# Patient Record
Sex: FEMALE | Race: WHITE | NOT HISPANIC OR LATINO | Employment: OTHER | URBAN - METROPOLITAN AREA
[De-identification: names, ages, dates, MRNs, and addresses within clinical notes are randomized per-mention and may not be internally consistent; named-entity substitution may affect disease eponyms.]

---

## 2022-03-15 ENCOUNTER — MEDICAL CORRESPONDENCE (OUTPATIENT)
Dept: HEALTH INFORMATION MANAGEMENT | Facility: CLINIC | Age: 77
End: 2022-03-15
Payer: COMMERCIAL

## 2022-03-17 ENCOUNTER — TRANSCRIBE ORDERS (OUTPATIENT)
Dept: OTHER | Age: 77
End: 2022-03-17

## 2022-03-17 DIAGNOSIS — R74.01 TRANSAMINITIS: Primary | ICD-10-CM

## 2022-03-17 DIAGNOSIS — K83.4 SPHINCTER OF ODDI DYSFUNCTION: ICD-10-CM

## 2022-03-21 ENCOUNTER — PATIENT OUTREACH (OUTPATIENT)
Dept: GASTROENTEROLOGY | Facility: CLINIC | Age: 77
End: 2022-03-21
Payer: COMMERCIAL

## 2022-03-21 NOTE — TELEPHONE ENCOUNTER
Referred by: Dr. Celestino Avila @ [facility]  Ph: [phone] Fx: [phone]    Prior patient of Dr. Del Angel, last seen in 2013, referred back for Hx of sphincter of oddi dysfunction; elevated LFTs, nausea    Per note from referring MD:  5 days ago she started having abdominal discomfort. It had been gradually worsening. She locates it to her epigastric region. She rated it an 8 of 10 and described as sharp and achy.  She has had loss of appetite and some excess gas. Denies diarrhea hematemesis or hematochezia or melena. No vomiting. No weight loss. Eating was aggravating. He had an ultrasound done at her same day clinic visit that was unremarkable. He also had a CBC with a differential showing a normal white count and normal hemoglobin. CMP was completed with normal electrolytes and blood sugar. Renal function normal. Katia has had some mild liver enzyme elevations in the past with normal hepatitis testing. However, her liver enzymes 4 days ago were more markedly elevated with , , and alk phos 207. Her total bili was elevated to 1.6 as well. CRP marginally elevated at 3.2.     Labs 3/15/22  BILIRUBIN,TOTAL 0.2 - 1.3 mg/dL 0.6     ALK PHOSPHATASE  38 - 126 U/L 151 High      AST (SGOT) 14 - 36 U/L 36     ALTv (SGPT) <35 U/L 100 High      eGFR >90 mL/min/1.73m2 >90         Offered clinic with Dr. Del Angel, pt will check with daughter and let us know what date/time works for clinic.    ML

## 2022-04-25 ENCOUNTER — TELEPHONE (OUTPATIENT)
Dept: GASTROENTEROLOGY | Facility: CLINIC | Age: 77
End: 2022-04-25
Payer: COMMERCIAL

## 2022-04-25 NOTE — TELEPHONE ENCOUNTER
[facility] Call Center    Phone Message    May a detailed message be left on voicemail: yes     Reason for Call: Other:     Katia is calling to ask if a phone visit is an option for her appointment on 4/27/2022.  Please call to discuss.    Action Taken: Message routed to:  [facility] ([facility]): carlos ji    Travel Screening: Not Applicable

## 2022-04-25 NOTE — TELEPHONE ENCOUNTER
Returned call to discuss plan for clinic visit- pt unable to do video visit, daughter cannot help her during this date/time. Changed to telephone visit.    ML

## 2022-04-27 ENCOUNTER — VIRTUAL VISIT (OUTPATIENT)
Dept: GASTROENTEROLOGY | Facility: CLINIC | Age: 77
End: 2022-04-27
Attending: FAMILY MEDICINE
Payer: COMMERCIAL

## 2022-04-27 DIAGNOSIS — R74.01 TRANSAMINITIS: ICD-10-CM

## 2022-04-27 DIAGNOSIS — K83.4 SPHINCTER OF ODDI DYSFUNCTION: ICD-10-CM

## 2022-04-27 PROCEDURE — 99204 OFFICE O/P NEW MOD 45 MIN: CPT | Mod: 95 | Performed by: INTERNAL MEDICINE

## 2022-04-27 RX ORDER — TURM/GING/BOS/YUC/WIL/CHAM/HOR 100-100 MG
1 TABLET ORAL
COMMUNITY

## 2022-04-27 RX ORDER — METHENAMINE HIPPURATE 1000 MG/1
1 TABLET ORAL 2 TIMES DAILY
COMMUNITY
Start: 2021-12-02 | End: 2022-05-26

## 2022-04-27 RX ORDER — MULTIVITAMIN
1 TABLET ORAL DAILY
COMMUNITY
Start: 2021-12-13

## 2022-04-27 RX ORDER — MIRABEGRON 50 MG/1
50 TABLET, EXTENDED RELEASE ORAL 2 TIMES DAILY
COMMUNITY
Start: 2021-12-01

## 2022-04-27 RX ORDER — PANTOPRAZOLE SODIUM 40 MG/1
40 TABLET, DELAYED RELEASE ORAL DAILY
Status: ON HOLD | COMMUNITY
Start: 2021-10-24 | End: 2022-06-07

## 2022-04-27 RX ORDER — URSODIOL 300 MG/1
300 CAPSULE ORAL 2 TIMES DAILY
COMMUNITY
Start: 2021-12-13

## 2022-04-27 RX ORDER — ATORVASTATIN CALCIUM 10 MG/1
10 TABLET, FILM COATED ORAL DAILY
COMMUNITY
Start: 2021-12-13

## 2022-04-27 RX ORDER — SUCRALFATE 1 G/1
1 TABLET ORAL 4 TIMES DAILY
COMMUNITY
Start: 2022-02-18

## 2022-04-27 RX ORDER — BUTALB/ACETAMINOPHEN/CAFFEINE 50-325-40
30 TABLET ORAL DAILY
COMMUNITY

## 2022-04-27 RX ORDER — TURMERIC 400 MG
CAPSULE ORAL
COMMUNITY

## 2022-04-27 RX ORDER — BACLOFEN 20 MG/1
20 TABLET ORAL 2 TIMES DAILY
COMMUNITY
Start: 2021-12-13

## 2022-04-27 RX ORDER — TRAZODONE HYDROCHLORIDE 150 MG/1
150 TABLET ORAL AT BEDTIME
COMMUNITY
Start: 2021-12-13

## 2022-04-27 NOTE — LETTER
"    4/27/2022         RE: Prabha Bowling  [address]  [facility] [address]        Dear Colleague,    Thank you for referring your patient, Prabha Bowling, to the [facility] [place]. Please see a copy of my visit note below.    PARVEZ is a 77 year old who is being evaluated via a billable telephone visit.      What phone number would you like to be contacted at? [phone]  How would you like to obtain your AVS? Mail a copy  Phone call duration:  Minutes    Violet Carney    Start time: 1015  End time: 1100    [facility]:     Follow up for  intractable abdominal pain, SOD    Last visit: 5/2013    Date of visit: 4/27/2022    Referring provider: Celestino Avila    CC: 77 year old female referred by Dr Avila for evaluation of intractable abdominal pain and intermittent LFT elevation.    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 She was last seen in clinic here in 5/2013 after having an EUS which showed a patent biliary orifice following sphincterotomy extension though BD tapering with sludge. Sludge was cleaned out with following ERCP and she was started on Ursodiol 300mg BID to decrease sludge burden. She has done very well until these past couple of months during which she has had 3 \"major pain attacks\" that feel very similarly to her prior episodes. Pain is epigastric, radiates to R shoulder, lasts up to 1 hour and occurs randomly. She has no pain outside of these attacks. She also notes constant nausea even with drinking water though has not had any significant weight loss. She does have acid " reflux but has been good about taking her protonix. Continues to have some dyspepsia however. She has alternating constipation and diarrhea related to her MS that has not changed over time. Her fist recent pain attack was in March during which time she had a rise in her LFTs including TB and ALP. She has had two episodes since then. Her LFTs have normalized in-between episodes. Also of note she had a MRCP in 10/2021 for some epigastric pain and was found to have a CBD 11mm (similar to previously) however new evidence of PD dilation to 4.5mm and panc head atrophy. She was also found to have antral ulcers at that time on EGD which was done for some hematochezia which has since resolved. Taken together, it is very pausible she is having biliary colic with recurrent biliary sludge/possible transient stones that may have even led to some episodes of pancreatitis. Certainly it is possible she has papillary restenosis. We will plan to evaluate further with EUS and ERCP. Can re-evaluate her gastric ulcers at that time as well with and EGD. We spent 45 minutes, discussing her situation and treatment options. Her daughter, Martina, was a part of this discussion as well. They are in agreement of further evaluation with procedures as planned.       RECOMMENDATIONS:   -- Plan will be to proceed with outpatient EGD/EUS/ERCP with Dr Del Angel in 2-3 weeks. Need to call daughter, Martina, at her listed cell number to schedule this, midday start time preferable so they can drive from [facility], and return in daylight..   -- Suspect she may benefit from general anesthesia with TEVO, pure propofol, particularly with her age and neurological conditions - will discuss w Anesthesia day of procedure  -- Continue Protonix as you are  -- Continue Ursodiol 300mg BID  -- Okay to continue Aspirin but would avoid NSAIDs       Total time spent 45 minutes, mostly in counseling.      Return to Clinic: TBD pending when above procedures are scheduled.  Patient would like to have port line replaced prior to proceeding with endoscopy and plans to have this done in next couple of days.       Martell Amor MD  Internal Medicine, PGY2  Department of Gastroenterology- Pancreas and Biliary Service   [facility]      Patient discussed and seen with Gastroenterology staff Dr. Bean MD, who is in agreement with the above.     Seen and examined with GI fellow, agree with findings and recommendations.  60 minutes inclding 45 minutes direct telephone, 15 mins review  Agree exactly w above, personaly edited above note  Suspicious for recurrent biliary sludge, w dilated CBD and transient bumping LFts.     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     She was last seen in clinic here in 5/2013 after having an EUS which showed a patent biliary orifice following sphincterotomy extension though BD tapering with sludge. Sludge was cleaned out with following ERCP and she was started on Ursodiol 300mg BID to decrease sludge burden.     She has done very well on Ursodiol and has not need further ERCP/EUS. She did develop some epigastric discomfort in 10/2021 in the setting of hematochezia. She ended up getting a MRCP which showed 11mm CBD (similar to previously) though no stenosis or filling defects. However PD was newly dilated to 4.5mm as well and noted to have pancreatic head atrophy. EUS at that time also found antral ulcers. She was started on PPI which she has continued.      In march of this year she then noted a severe bout of epigastric pain radiating to her R shoulder. She presented to urgent care where her LFTs were elevated including TB and ALP. Abd US showed PD 4.5, and CBD 11 but not evidence of stones or sludge. Since that time she has had two additional episodes of the pain attacks that feel very similarly to her prior episodes. Pain lasts up to 1 hour and occurs randomly. She has no pain outside of these attacks. She also notes constant nausea even with drinking water though has not had any significant weight loss. She has alternating constipation and diarrhea related to her MS that has not changed over time. Her fist recent pain attack was in March during which time she had a rise in her LFTs including TB and ALP. She has had two episodes since then. Her LFTs have normalized in-between episodes.    In terms of her MS, this has been under relatively good control other than some slightly worsened leg weakness. She lives alone still in an apartment in [place] but has a house keeper come every week. Her daughter also lives close by and helps drive her to appointments. She is scheduled to get her port exchanged in the next week or so and is hoping to have this done before having any sort of endoscopic procedure as she apparently has very difficult access.     Targeted GI review of systems complete and is otherwise negative.     Past Medical History:   Diagnosis Date     Allergic rhinitis      Constipation      Gastro-oesophageal reflux disease      Hypertension      Multiple sclerosis (H)      Osteoporosis      Sphincter of Oddi dysfunction      Urinary incontinence        Past Surgical History:   Procedure Laterality Date     bilateral breast biopsies[       bilateral cataract extractions[       CHOLECYSTECTOMY       COLONOSCOPY       ENDOSCOPIC RETROGRADE CHOLANGIOPANCREATOGRAM  8/30/2012    Procedure: ENDOSCOPIC RETROGRADE CHOLANGIOPANCREATOGRAM;  ERCP with biliary  sphincterotomy and biliary stent placement;  Surgeon: Rubén Del Angel MD;  Location: [facility]     ENDOSCOPIC RETROGRADE CHOLANGIOPANCREATOGRAM  6/14/2013    Procedure: ENDOSCOPIC RETROGRADE CHOLANGIOPANCREATOGRAM;  Endoscopic Retrograde Cholangiopancreatogram , with Biliary Sweeping;  Surgeon: Rubén Del Angel MD;  Location:  OR      [facility] ENDOSCOPY W EUS  5/7/2013    Procedure: COMBINED ENDOSCOPIC ULTRASOUND, ESOPHAGOSCOPY, GASTROSCOPY, DUODENOSCOPY (EGD);  Surgeon: Ita Gong MD;  Location:  GI     HYSTERECTOMY      with removal one ovary     TONSILLECTOMY         Family History   Problem Relation Age of Onset     Asthma Brother      C.A.D. Father      C.A.D. Mother      Hypertension Mother      Hypertension Father      Hypertension Sister      Hypertension Sister      Prostate Cancer Brother      Cancer Mother         brain CA       Social History     Tobacco Use     Smoking status: Never Smoker     Smokeless tobacco: Never Used   Substance Use Topics     Alcohol use: No       Physical exam:   Not obtained during phone visit.    Labs:   Component 04/25/22 04/25/22 04/25/22 03/15/22 03/15/22 03/11/22   SODIUM -- -- -- 137 -- 137   POTASSIUM -- -- -- 3.9 -- 3.5   CHLORIDE -- -- -- 104 -- 104   CO2,TOTAL -- -- -- 33 -- 29   ANION GAP -- -- -- 0.6 -- 4.3 Low    GLUCOSE -- -- -- 97 -- 115 High    CALCIUM -- -- 9.7 9.5 -- 9.2   BUN -- -- -- 20 -- 13   CREATININE -- -- -- 0.48 -- 0.44 Low    BUN/CREAT RATIO -- -- -- 42 -- 30   ALBUMIN -- -- -- 4.1 -- 4.3   PROTEIN,TOTAL -- -- -- 6.9 6.2 7.1   GLOBULIN                  -- -- -- 2.8 -- 2.8   A/G RATIO -- -- -- 1.5 -- 1.5   BILIRUBIN,TOTAL -- -- -- 0.6 -- 1.6 High    ALK PHOSPHATASE  -- -- -- 151 -- 207 High    AST (SGOT) -- 37 High  -- 36 -- 347 High    ALTv (SGPT) 24 -- -- 100 -- 387 High    eGFR -- -- -- >90  -- --   GFR if  -- -- -- -- -- >60   GFR if not  -- -- -- -- -- >60         Relevant imaging:   Abdominal  US 3/2022:  1. Status post cholecystectomy.  Dilated common bile duct is unchanged and   likely related to post cholecystectomy state.  No intrahepatic biliary duct   dilatation.   2. Mildly dilated pancreatic duct is similar to the prior MRCP from October 2021.  Visualized portion of the pancreatic parenchyma is unremarkable.   3. Otherwise, unremarkable abdominal ultrasound.    Narrative    EXAM:   US ABDOMEN COMPLETE     INDICATION:   Abdominal pain, unspecified abdominal location     COMPARISON:   Radiographs from earlier the same day.  MRI abdomen MRCP October 14, 2021.     FINDINGS:   The pancreatic duct is mildly dilated at 4.5 mm, which appears similar to the   prior MRCP.  The visualized portion of the pancreatic parenchyma is   unremarkable in appearance.  Gallbladder is surgically absent.  The common duct   is dilated at 1.0 cm near the pancreatic head, similar to the prior MRI.  No   intrahepatic biliary duct dilatation.  The liver is relatively homogeneous in   echotexture and unremarkable in appearance.  No solid mass within the liver.   The abdominal aorta is not aneurysmal.  IVC is patent at the level of the   liver.  Spleen is unremarkable in appearance.  The kidneys are without solid   renal mass, hydronephrosis, perinephric fluid collection, or discrete   nephrolithiasis.  There is a 2.8 cm cyst within the left kidney.  The right   kidney measures approximately 9.6 cm in length and the left kidney measures   approximately 9.9 cm in length.  No free fluid is seen within the visualized   portion of the abdomen.    Procedure Note    Anne Guardado MD - 03/11/2022   Formatting of this note might be different from the original.   EXAM:   US ABDOMEN COMPLETE     INDICATION:   Abdominal pain, unspecified abdominal location     COMPARISON:   Radiographs from earlier the same day.  MRI abdomen MRCP October 14, 2021.     FINDINGS:   The pancreatic duct is mildly dilated at 4.5 mm, which appears  similar to the   prior MRCP.  The visualized portion of the pancreatic parenchyma is   unremarkable in appearance.  Gallbladder is surgically absent.  The common duct   is dilated at 1.0 cm near the pancreatic head, similar to the prior MRI.  No   intrahepatic biliary duct dilatation.  The liver is relatively homogeneous in   echotexture and unremarkable in appearance.  No solid mass within the liver.   The abdominal aorta is not aneurysmal.  IVC is patent at the level of the   liver.  Spleen is unremarkable in appearance.  The kidneys are without solid   renal mass, hydronephrosis, perinephric fluid collection, or discrete   nephrolithiasis.  There is a 2.8 cm cyst within the left kidney.  The right   kidney measures approximately 9.6 cm in length and the left kidney measures   approximately 9.9 cm in length.  No free fluid is seen within the visualized   portion of the abdomen.     IMPRESSION:   1. Status post cholecystectomy.  Dilated common bile duct is unchanged and   likely related to post cholecystectomy state.  No intrahepatic biliary duct   dilatation.   2. Mildly dilated pancreatic duct is similar to the prior MRCP from October 2021.  Visualized portion of the pancreatic parenchyma is unremarkable.   3. Otherwise, unremarkable abdominal ultrasound.      MRCP 10/14/2021:  FINDINGS:   Liver is normal in signal intensity on T2 imaging.  No focal hepatic lesion.   Gallbladder is surgically absent.  Common bile duct measures up to 11 mm. No   evidence of biliary beading, stenosis, or filling defect.  There is pneumobilia   likely related to patient's recent instrumentation.  Pancreatic duct is   non-dilated.         The pancreas is notable for fat atrophy of the pancreatic head.  It is   otherwise unremarkable in appearance.         The spleen, adrenal glands, and kidneys are unremarkable. No hydronephrosis.   There are simple appearing renal cysts present.         No enlarged lymph nodes.  Small amount of  free fluid in the abdomen adjacent to   the hepatic capsule.  Included small and large bowel loops are non-dilated.         No large signal abnormality in the visualized lungs. No suspicious bone marrow   or muscle signal abnormality.         IMPRESSION:   1. Pneumobilia likely related to patient's recent instrumentation.   2. Common bile duct dilation without obstructing mass or stone.   3. Fat atrophy of the pancreatic head with no other pancreatic abnormality.      Endoscopy:  EUS 5/7/2013:  Findings:        Endoscopic exam with the side viewing scope was normal. The major        papilla was normal endoscopically and sonographically.        The bile duct was non-dilated and measured 10 mm in maximal diameter.        The gallbladder was absent. Air and sludge were seen in the bile duct.        Sludge was seen passing through the biliary orifice. There were no        stones or sludge.        The pancreatic parenchyma appeared normal. There were no features of        chronic pancreatitis. No masses, cysts or stones were visualized in the        pancreatic parenchyma. The main pancreatic duct was followed from the        head to the body, excluding pancreas divisum. The pancreatic duct        measured 2.6 mm in the head, 1.1 in the body and 0.9 in the tail of the        pancreas.        No lymph nodes were seen in the upper abdomen and mediastinum.        No masses were seen in the visualized portions of the liver.        The left adrenal appeared normal.                                                                                    Impression:               67 yo female with MS, s/p ERCP with sphincterotomy,                             recurrent abdominal pain                             - Patent biliary orifice with air in the bile duct                             however the duct tapers distally and there was                             echogenic sludge in the duct. Some sludge was seen                              passing through the biliary orifce                             - Normal pancreas   Recommendation:           - Discharge patient to home.                             - Follow-up with Dr. Del Angel                             - Consider ursodiol for recurrent biliary sludge

## 2022-04-27 NOTE — NURSING NOTE
Patient states she is also taking a Luten supplement daily and has Ocrivus infusions two times per year.       Violet Carney on 4/27/2022 at 10:01 AM

## 2022-04-27 NOTE — PROGRESS NOTES
"PARVEZ is a 77 year old who is being evaluated via a billable telephone visit.      What phone number would you like to be contacted at? [phone]  How would you like to obtain your AVS? Mail a copy  Phone call duration:  Minutes    Violet Carney    Start time: 1015  End time: 1100    [facility]:     Follow up for  intractable abdominal pain, SOD    Last visit: 5/2013    Date of visit: 4/27/2022    Referring provider: Celestino Avila    CC: 77 year old female referred by Dr Avila for evaluation of intractable abdominal pain and intermittent LFT elevation.    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 She was last seen in clinic here in 5/2013 after having an EUS which showed a patent biliary orifice following sphincterotomy extension though BD tapering with sludge. Sludge was cleaned out with following ERCP and she was started on Ursodiol 300mg BID to decrease sludge burden. She has done very well until these past couple of months during which she has had 3 \"major pain attacks\" that feel very similarly to her prior episodes. Pain is epigastric, radiates to R shoulder, lasts up to 1 hour and occurs randomly. She has no pain outside of these attacks. She also notes constant nausea even with drinking water though has not had any significant weight loss. She does have acid reflux but has been good about taking her protonix. Continues to have some dyspepsia however. She has alternating constipation and diarrhea related to her MS that has not changed over time. Her fist recent pain attack was in March during which time she had a rise in her LFTs " including TB and ALP. She has had two episodes since then. Her LFTs have normalized in-between episodes. Also of note she had a MRCP in 10/2021 for some epigastric pain and was found to have a CBD 11mm (similar to previously) however new evidence of PD dilation to 4.5mm and panc head atrophy. She was also found to have antral ulcers at that time on EGD which was done for some hematochezia which has since resolved. Taken together, it is very pausible she is having biliary colic with recurrent biliary sludge/possible transient stones that may have even led to some episodes of pancreatitis. Certainly it is possible she has papillary restenosis. We will plan to evaluate further with EUS and ERCP. Can re-evaluate her gastric ulcers at that time as well with and EGD. We spent 45 minutes, discussing her situation and treatment options. Her daughter, Martina, was a part of this discussion as well. They are in agreement of further evaluation with procedures as planned.       RECOMMENDATIONS:   -- Plan will be to proceed with outpatient EGD/EUS/ERCP with Dr Del Angel in 2-3 weeks. Need to call daughter, Martina, at her listed cell number to schedule this, midday start time preferable so they can drive from [facility], and return in daylight..   -- Suspect she may benefit from general anesthesia with TEVO, pure propofol, particularly with her age and neurological conditions - will discuss w Anesthesia day of procedure  -- Continue Protonix as you are  -- Continue Ursodiol 300mg BID  -- Okay to continue Aspirin but would avoid NSAIDs       Total time spent 45 minutes, mostly in counseling.      Return to Clinic: TBD pending when above procedures are scheduled. Patient would like to have port line replaced prior to proceeding with endoscopy and plans to have this done in next couple of days.       Martell Amor MD  Internal Medicine, PGY2  Department of Gastroenterology- Pancreas and Biliary Service   [facility]  [place]      Patient discussed and seen with Gastroenterology staff Dr. Bean MD, who is in agreement with the above.     Seen and examined with GI fellow, agree with findings and recommendations.  60 minutes inclding 45 minutes direct telephone, 15 mins review  Agree exactly w above, personaly edited above note  Suspicious for recurrent biliary sludge, w dilated CBD and transient bumping LFts.     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     She was last seen in clinic here in 5/2013 after having an EUS which showed a patent biliary orifice following sphincterotomy extension though BD tapering with sludge. Sludge was cleaned out with following ERCP and she was started on Ursodiol 300mg BID to decrease sludge burden.     She has done very well on Ursodiol and has not need further ERCP/EUS. She did develop some epigastric discomfort in 10/2021 in the setting of hematochezia. She ended up getting a MRCP which showed 11mm CBD (similar to previously) though no stenosis or filling defects. However PD was newly dilated to 4.5mm as well and noted to have pancreatic head atrophy. EUS at that time also found antral ulcers. She was started on PPI which she has continued.     In march of this year she then noted a severe bout of epigastric pain radiating to her R shoulder. She presented to urgent care where her LFTs were elevated including TB and ALP. Abd US showed PD 4.5, and CBD 11 but not evidence of stones or sludge. Since  that time she has had two additional episodes of the pain attacks that feel very similarly to her prior episodes. Pain lasts up to 1 hour and occurs randomly. She has no pain outside of these attacks. She also notes constant nausea even with drinking water though has not had any significant weight loss. She has alternating constipation and diarrhea related to her MS that has not changed over time. Her fist recent pain attack was in March during which time she had a rise in her LFTs including TB and ALP. She has had two episodes since then. Her LFTs have normalized in-between episodes.    In terms of her MS, this has been under relatively good control other than some slightly worsened leg weakness. She lives alone still in an apartment in [place] but has a house keeper come every week. Her daughter also lives close by and helps drive her to appointments. She is scheduled to get her port exchanged in the next week or so and is hoping to have this done before having any sort of endoscopic procedure as she apparently has very difficult access.     Targeted GI review of systems complete and is otherwise negative.     Past Medical History:   Diagnosis Date     Allergic rhinitis      Constipation      Gastro-oesophageal reflux disease      Hypertension      Multiple sclerosis (H)      Osteoporosis      Sphincter of Oddi dysfunction      Urinary incontinence        Past Surgical History:   Procedure Laterality Date     bilateral breast biopsies[       bilateral cataract extractions[       CHOLECYSTECTOMY       COLONOSCOPY       ENDOSCOPIC RETROGRADE CHOLANGIOPANCREATOGRAM  8/30/2012    Procedure: ENDOSCOPIC RETROGRADE CHOLANGIOPANCREATOGRAM;  ERCP with biliary sphincterotomy and biliary stent placement;  Surgeon: Rubén Del Angel MD;  Location: [facility]     ENDOSCOPIC RETROGRADE CHOLANGIOPANCREATOGRAM  6/14/2013    Procedure: ENDOSCOPIC RETROGRADE CHOLANGIOPANCREATOGRAM;  Endoscopic Retrograde Cholangiopancreatogram , with  Biliary Sweeping;  Surgeon: Rubén Del Angel MD;  Location:  OR      [facility] ENDOSCOPY W EUS  5/7/2013    Procedure: COMBINED ENDOSCOPIC ULTRASOUND, ESOPHAGOSCOPY, GASTROSCOPY, DUODENOSCOPY (EGD);  Surgeon: Ita Gong MD;  Location:  GI     HYSTERECTOMY      with removal one ovary     TONSILLECTOMY         Family History   Problem Relation Age of Onset     Asthma Brother      C.A.D. Father      C.A.D. Mother      Hypertension Mother      Hypertension Father      Hypertension Sister      Hypertension Sister      Prostate Cancer Brother      Cancer Mother         brain CA       Social History     Tobacco Use     Smoking status: Never Smoker     Smokeless tobacco: Never Used   Substance Use Topics     Alcohol use: No       Physical exam:   Not obtained during phone visit.    Labs:   Component 04/25/22 04/25/22 04/25/22 03/15/22 03/15/22 03/11/22   SODIUM -- -- -- 137 -- 137   POTASSIUM -- -- -- 3.9 -- 3.5   CHLORIDE -- -- -- 104 -- 104   CO2,TOTAL -- -- -- 33 -- 29   ANION GAP -- -- -- 0.6 -- 4.3 Low    GLUCOSE -- -- -- 97 -- 115 High    CALCIUM -- -- 9.7 9.5 -- 9.2   BUN -- -- -- 20 -- 13   CREATININE -- -- -- 0.48 -- 0.44 Low    BUN/CREAT RATIO -- -- -- 42 -- 30   ALBUMIN -- -- -- 4.1 -- 4.3   PROTEIN,TOTAL -- -- -- 6.9 6.2 7.1   GLOBULIN                  -- -- -- 2.8 -- 2.8   A/G RATIO -- -- -- 1.5 -- 1.5   BILIRUBIN,TOTAL -- -- -- 0.6 -- 1.6 High    ALK PHOSPHATASE  -- -- -- 151 -- 207 High    AST (SGOT) -- 37 High  -- 36 -- 347 High    ALTv (SGPT) 24 -- -- 100 -- 387 High    eGFR -- -- -- >90  -- --   GFR if  -- -- -- -- -- >60   GFR if not  -- -- -- -- -- >60         Relevant imaging:   Abdominal US 3/2022:  1. Status post cholecystectomy.  Dilated common bile duct is unchanged and   likely related to post cholecystectomy state.  No intrahepatic biliary duct   dilatation.   2. Mildly dilated pancreatic duct is similar to the prior MRCP from October 2021.   Visualized portion of the pancreatic parenchyma is unremarkable.   3. Otherwise, unremarkable abdominal ultrasound.    Narrative    EXAM:   US ABDOMEN COMPLETE     INDICATION:   Abdominal pain, unspecified abdominal location     COMPARISON:   Radiographs from earlier the same day.  MRI abdomen MRCP October 14, 2021.     FINDINGS:   The pancreatic duct is mildly dilated at 4.5 mm, which appears similar to the   prior MRCP.  The visualized portion of the pancreatic parenchyma is   unremarkable in appearance.  Gallbladder is surgically absent.  The common duct   is dilated at 1.0 cm near the pancreatic head, similar to the prior MRI.  No   intrahepatic biliary duct dilatation.  The liver is relatively homogeneous in   echotexture and unremarkable in appearance.  No solid mass within the liver.   The abdominal aorta is not aneurysmal.  IVC is patent at the level of the   liver.  Spleen is unremarkable in appearance.  The kidneys are without solid   renal mass, hydronephrosis, perinephric fluid collection, or discrete   nephrolithiasis.  There is a 2.8 cm cyst within the left kidney.  The right   kidney measures approximately 9.6 cm in length and the left kidney measures   approximately 9.9 cm in length.  No free fluid is seen within the visualized   portion of the abdomen.    Procedure Note    Anne Guardado MD - 03/11/2022   Formatting of this note might be different from the original.   EXAM:   US ABDOMEN COMPLETE     INDICATION:   Abdominal pain, unspecified abdominal location     COMPARISON:   Radiographs from earlier the same day.  MRI abdomen MRCP October 14, 2021.     FINDINGS:   The pancreatic duct is mildly dilated at 4.5 mm, which appears similar to the   prior MRCP.  The visualized portion of the pancreatic parenchyma is   unremarkable in appearance.  Gallbladder is surgically absent.  The common duct   is dilated at 1.0 cm near the pancreatic head, similar to the prior MRI.  No   intrahepatic biliary  duct dilatation.  The liver is relatively homogeneous in   echotexture and unremarkable in appearance.  No solid mass within the liver.   The abdominal aorta is not aneurysmal.  IVC is patent at the level of the   liver.  Spleen is unremarkable in appearance.  The kidneys are without solid   renal mass, hydronephrosis, perinephric fluid collection, or discrete   nephrolithiasis.  There is a 2.8 cm cyst within the left kidney.  The right   kidney measures approximately 9.6 cm in length and the left kidney measures   approximately 9.9 cm in length.  No free fluid is seen within the visualized   portion of the abdomen.     IMPRESSION:   1. Status post cholecystectomy.  Dilated common bile duct is unchanged and   likely related to post cholecystectomy state.  No intrahepatic biliary duct   dilatation.   2. Mildly dilated pancreatic duct is similar to the prior MRCP from October 2021.  Visualized portion of the pancreatic parenchyma is unremarkable.   3. Otherwise, unremarkable abdominal ultrasound.      MRCP 10/14/2021:  FINDINGS:   Liver is normal in signal intensity on T2 imaging.  No focal hepatic lesion.   Gallbladder is surgically absent.  Common bile duct measures up to 11 mm. No   evidence of biliary beading, stenosis, or filling defect.  There is pneumobilia   likely related to patient's recent instrumentation.  Pancreatic duct is   non-dilated.         The pancreas is notable for fat atrophy of the pancreatic head.  It is   otherwise unremarkable in appearance.         The spleen, adrenal glands, and kidneys are unremarkable. No hydronephrosis.   There are simple appearing renal cysts present.         No enlarged lymph nodes.  Small amount of free fluid in the abdomen adjacent to   the hepatic capsule.  Included small and large bowel loops are non-dilated.         No large signal abnormality in the visualized lungs. No suspicious bone marrow   or muscle signal abnormality.         IMPRESSION:   1. Pneumobilia  likely related to patient's recent instrumentation.   2. Common bile duct dilation without obstructing mass or stone.   3. Fat atrophy of the pancreatic head with no other pancreatic abnormality.      Endoscopy:  EUS 5/7/2013:  Findings:        Endoscopic exam with the side viewing scope was normal. The major        papilla was normal endoscopically and sonographically.        The bile duct was non-dilated and measured 10 mm in maximal diameter.        The gallbladder was absent. Air and sludge were seen in the bile duct.        Sludge was seen passing through the biliary orifice. There were no        stones or sludge.        The pancreatic parenchyma appeared normal. There were no features of        chronic pancreatitis. No masses, cysts or stones were visualized in the        pancreatic parenchyma. The main pancreatic duct was followed from the        head to the body, excluding pancreas divisum. The pancreatic duct        measured 2.6 mm in the head, 1.1 in the body and 0.9 in the tail of the        pancreas.        No lymph nodes were seen in the upper abdomen and mediastinum.        No masses were seen in the visualized portions of the liver.        The left adrenal appeared normal.                                                                                    Impression:               69 yo female with MS, s/p ERCP with sphincterotomy,                             recurrent abdominal pain                             - Patent biliary orifice with air in the bile duct                             however the duct tapers distally and there was                             echogenic sludge in the duct. Some sludge was seen                             passing through the biliary orifce                             - Normal pancreas   Recommendation:           - Discharge patient to home.                             - Follow-up with Dr. Del Angel                             - Consider ursodiol for recurrent biliary  sludge

## 2022-04-27 NOTE — PROGRESS NOTES
"PARVEZ is a 77 year old who is being evaluated via a billable telephone visit.      What phone number would you like to be contacted at? ***  How would you like to obtain your AVS? {AVS Preference:776608}      Subjective   PARVEZ is a 77 year old who presents for the following health issues {ACCOMPANIED BY STATEMENT (Optional):691084}    HPI     ***    Review of Systems   {ROS COMP (Optional):158633}      Objective    Vitals - Patient Reported  Systolic (Patient Reported): 128  Diastolic (Patient Reported): 74  Weight (Patient Reported): 64.9 kg (143 lb)  Pulse (Patient Reported): 73        Physical Exam   {GENERAL APPEARANCE:50::\"healthy\",\"alert\",\"no distress\"}  PSYCH: Alert and oriented times 3;    "

## 2022-04-28 ENCOUNTER — PATIENT OUTREACH (OUTPATIENT)
Dept: GASTROENTEROLOGY | Facility: CLINIC | Age: 77
End: 2022-04-28
Payer: COMMERCIAL

## 2022-04-28 NOTE — PATIENT INSTRUCTIONS
Follow up:    Dr. Del Angel has outlined the following steps after your recent clinic visit:     RECOMMENDATIONS:   -- Plan will be to proceed with outpatient EGD/EUS/ERCP with Dr Del Angel in 2-3 weeks. Need to call daughter, Martina, at her listed cell number to schedule this, midday start time preferable so they can drive from [employer], and return in daylight..   -- Continue Protonix as you are  -- Continue Ursodiol 300mg BID  -- Okay to continue Aspirin but would avoid NSAIDs       Please call with any questions or concerns regarding your clinic visit today.    It is a pleasure being involved in your health care.    Contacts post-consultation depending on your need:    Schedule Clinic Appointments            [phone] # 1   M-F 7:30 - 5 pm    Tata Acevedo RN Care Coordinator  [phone]     OR Procedure Scheduling                             [phone]    For urgent/emergent questions after business hours, you may reach the on-call GI Fellow by contacting the [facility]  at [phone].    How do I schedule labs, imaging studies, or procedures that were ordered in clinic today?     Labs: To schedule lab appointment at the [facility], use my chart or call [phone]. If you have a [facility] lab closer to home where you are regularly seen you can give them a call.     Procedures: If a colonoscopy, upper endoscopy, breath test, esophageal manometry, or pH impedence was ordered today, our endoscopy team will call you to schedule this. If you have not heard from our endoscopy team within a week, please call [phone] to schedule.     Imaging Studies: If you were scheduled for a CT scan, X-ray, MRI, ultrasound, HIDA scan or other imaging study, please call [phone] to have this scheduled.     Referral: If a referral to another specialty was ordered, expect a phone call or follow instructions above. If you have not heard from anyone regarding your referral in a week,  please call our clinic to check the status.     How to I schedule a follow-up visit?  If you did not schedule a follow-up visit today, please call [phone] to schedule a follow-up office visit.

## 2022-04-28 NOTE — TELEPHONE ENCOUNTER
Called patient and daughter Martina regarding follow up after clinic:    Martina will call back for times after her mom's visit for a port issue    RECOMMENDATIONS:   -- Plan will be to proceed with outpatient EGD/EUS/ERCP with Dr Del Angel in 2-3 weeks. Need to call daughter, Martina, at her listed cell number to schedule this, midday start time preferable so they can drive from [email or web address] [place], and return in daylight..   -- Continue Protonix as you are  -- Continue Ursodiol 300mg BID  -- Okay to continue Aspirin but would avoid NSAIDs    Please assist in scheduling:     Procedure/Imaging/Clinic: EGD/EUS/ERCP  Physician: Selma  Timin/31  Procedure length:provider average  Anesthesia:gen  Dx: SOD  Tier:2  Location: [facility]       Called to discuss with patient. Explained they can expect a call from  for date and time of procedure, will need a , someone to stay with them for 24 hours and should stay in town for 24 hours (within 45 min of Hospital) post procedure    Patient needs to get pre-op physical completed. If outside [facility] system will need physical faxed to number [phone]   If you do not get a preop physical, your procedure could be cancelled, patient voiced understanding*    Preop Plan:PCP    Med Review    Blood thinner -  no  ASA - no  Diabetic - no    COVID test discussed: will do at PCP    Patient Education r/t procedure:done    Does patient have any history of gastric bypass/gastric surgery/altered panc/bili anatomy?no    A pre-op nurse will call 1-2 days prior to the procedure. I    NPO/Prep: not discussed    Other specific details/comments:none     Verbalized understanding of all instructions. All questions answered.

## 2022-05-03 ENCOUNTER — PREP FOR PROCEDURE (OUTPATIENT)
Dept: GASTROENTEROLOGY | Facility: CLINIC | Age: 77
End: 2022-05-03
Payer: COMMERCIAL

## 2022-05-03 ENCOUNTER — TRANSFERRED RECORDS (OUTPATIENT)
Dept: HEALTH INFORMATION MANAGEMENT | Facility: CLINIC | Age: 77
End: 2022-05-03
Payer: COMMERCIAL

## 2022-05-03 DIAGNOSIS — K83.4 SPHINCTER OF ODDI DYSFUNCTION: Primary | ICD-10-CM

## 2022-05-03 NOTE — TELEPHONE ENCOUNTER
pT will have port exchanged on 5/12, daughter asking for dates last week of May, wants later time of day, 10am after.    Orders placed for 5/31, noting requested later start time, reminded pt will need preop and covid test.  ML

## 2022-05-16 ENCOUNTER — TELEPHONE (OUTPATIENT)
Dept: GASTROENTEROLOGY | Facility: CLINIC | Age: 77
End: 2022-05-16
Payer: COMMERCIAL

## 2022-05-16 NOTE — TELEPHONE ENCOUNTER
[facility] Call Center    Phone Message    May a detailed message be left on voicemail: yes     Reason for Call: Other:     Katia is calling to ask if the pre-op she had on 5/9/2022 will suffice for the procedure scheduled for 5/31/2022.  Please call to clarify.    Action Taken: Message routed to:  [facility] ([facility]): carlos ji    Travel Screening: Not Applicable

## 2022-05-19 DIAGNOSIS — Z11.59 ENCOUNTER FOR SCREENING FOR OTHER VIRAL DISEASES: Primary | ICD-10-CM

## 2022-05-19 DIAGNOSIS — Z01.810 PRE-OPERATIVE CARDIOVASCULAR EXAMINATION: Primary | ICD-10-CM

## 2022-05-20 ENCOUNTER — DOCUMENTATION ONLY (OUTPATIENT)
Dept: GASTROENTEROLOGY | Facility: CLINIC | Age: 77
End: 2022-05-20
Payer: COMMERCIAL

## 2022-05-20 NOTE — PROGRESS NOTES
VO - Fax COVID and EKG orders to [facility].    Faxed to [phone]    Faxed on 5/20/2022 at 1:16PM

## 2022-05-23 ENCOUNTER — TRANSFERRED RECORDS (OUTPATIENT)
Dept: HEALTH INFORMATION MANAGEMENT | Facility: CLINIC | Age: 77
End: 2022-05-23
Payer: COMMERCIAL

## 2022-05-24 ENCOUNTER — DOCUMENTATION ONLY (OUTPATIENT)
Dept: GASTROENTEROLOGY | Facility: CLINIC | Age: 77
End: 2022-05-24
Payer: COMMERCIAL

## 2022-05-24 NOTE — PROGRESS NOTES
VO - Fax EKG and COVID to [facility]    Faxed orders on 5/23/2022 at 4:05PM  [phone]    Called [phone] on 5/24/2022 at 10:55AM and they did receive the fax. EKG patient completed  Yesterday. Pending COVID testing on Friday.

## 2022-05-26 ENCOUNTER — PREP FOR PROCEDURE (OUTPATIENT)
Dept: GASTROENTEROLOGY | Facility: CLINIC | Age: 77
End: 2022-05-26
Payer: COMMERCIAL

## 2022-05-26 ENCOUNTER — PATIENT OUTREACH (OUTPATIENT)
Dept: GASTROENTEROLOGY | Facility: CLINIC | Age: 77
End: 2022-05-26
Payer: COMMERCIAL

## 2022-05-29 DIAGNOSIS — Z11.59 ENCOUNTER FOR SCREENING FOR OTHER VIRAL DISEASES: Primary | ICD-10-CM

## 2022-06-01 NOTE — BRIEF OP NOTE
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]   Post-operative diagnosis As prior   Procedure: Procedure(s):  ENDOSCOPIC ULTRASOUND, ESOPHAGOSCOPY / UPPER GASTROINTESTINAL TRACT (GI)  ENDOSCOPIC RETROGRADE CHOLANGIOPANCREATOGRAPHY   Surgeon(s): Surgeon(s) and Role:  Panel 1:     * Geoffrey Quintanilla MD - Primary  Panel 2:     * Rubén Del Angel MD - Primary   Estimated blood loss: 1 mL    Specimens: * No specimens in log *   Findings:    76 yo female with history of MS, hiatal hernia, gastric ulcers/gastritis, gallstones/choledocholithiasis s/p lap becca many years ago and 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 She was last seen in clinic here in 5/2013 after having an EUS which showed a patent biliary orifice following sphincterotomy extension though BD tapering with sludge. Sludge was cleaned out with following ERCP and she was started on Ursodiol 300mg BID to decrease sludge burden. She has done very well until these past couple of months during which she has had 3 \"major pain attacks\" that feel very similarly to her prior episodes. Her fist recent pain attack was in March during which time she had a rise in her LFTs. MRCP in 10/2021 for some epigastric pain and was found to have a CBD 11mm (similar to previously) however new evidence of PD dilation to 4.5mm and panc head atrophy. She was also found to have antral ulcers at that time on EGD which was done for some hematochezia which has since resolved. Taken together, it is very pausible she is having biliary colic with recurrent biliary sludge/possible transient stones. Certainly it is possible she has papillary restenosis. We will plan to evaluate further with EGD/EUS and ERCP.       - Prior biliary sphincterotomy appeared open.   - The " entire main bile duct was dilated to ~11 mm.   - The biliary tree was swept and sludge was found.   - One prophylactic 4 Fr Pa plastic stent was placed into the ventral pancreatic duct.   - Biliary sphincterotomy was successfully dilated to 10 mm with adequate drainage of contrast.       - Observe patient in PACU for possible discharge same day.  - Increase pantoprazole to 40 mg BID given gastric antral erosions and small ulcerations seen on upper endoscopy earlier today (sent to patient's pharmacy).  - Obtain a KUB in 4 weeks to ensure spontanous passage of pancreatic stent. If still in place will need an EGD in unit J for stent removal.   - Return to GI clinic with Dr. Del Angel in 2 months.   - The findings and recommendations were discussed with the patient and their family.

## 2022-06-06 ENCOUNTER — ANESTHESIA EVENT (OUTPATIENT)
Dept: SURGERY | Facility: CLINIC | Age: 77
End: 2022-06-06
Payer: MEDICARE

## 2022-06-07 ENCOUNTER — ANESTHESIA (OUTPATIENT)
Dept: SURGERY | Facility: CLINIC | Age: 77
End: 2022-06-07
Payer: MEDICARE

## 2022-06-07 ENCOUNTER — HOSPITAL ENCOUNTER (OUTPATIENT)
Facility: CLINIC | Age: 77
Discharge: HOME | End: 2022-06-07
Attending: INTERNAL MEDICINE | Admitting: INTERNAL MEDICINE
Payer: MEDICARE

## 2022-06-07 VITALS
DIASTOLIC BLOOD PRESSURE: 91 MMHG | TEMPERATURE: 97.6 F | WEIGHT: 161.38 LBS | BODY MASS INDEX: 29.7 KG/M2 | RESPIRATION RATE: 20 BRPM | OXYGEN SATURATION: 100 % | SYSTOLIC BLOOD PRESSURE: 181 MMHG | HEART RATE: 75 BPM | HEIGHT: 62 IN

## 2022-06-07 DIAGNOSIS — K25.9 GASTRIC EROSION, UNSPECIFIED CHRONICITY: Primary | ICD-10-CM

## 2022-06-07 LAB
ALBUMIN SERPL-MCNC: 3.5 G/DL (ref 3.4–5)
ALP SERPL-CCNC: 88 U/L (ref 40–150)
ALT SERPL W P-5'-P-CCNC: 21 U/L (ref 0–50)
AMYLASE SERPL-CCNC: 41 U/L (ref 30–110)
ANION GAP SERPL CALCULATED.3IONS-SCNC: 6 MMOL/L (ref 3–14)
AST SERPL W P-5'-P-CCNC: 16 U/L (ref 0–45)
BILIRUB SERPL-MCNC: 0.6 MG/DL (ref 0.2–1.3)
BUN SERPL-MCNC: 21 MG/DL (ref 7–30)
CALCIUM SERPL-MCNC: 9.2 MG/DL (ref 8.5–10.1)
CHLORIDE BLD-SCNC: 110 MMOL/L (ref 94–109)
CO2 SERPL-SCNC: 27 MMOL/L (ref 20–32)
CREAT SERPL-MCNC: 0.54 MG/DL (ref 0.52–1.04)
ERYTHROCYTE [DISTWIDTH] IN BLOOD BY AUTOMATED COUNT: 13.7 % (ref 10–15)
GFR SERPL CREATININE-BSD FRML MDRD: >90 ML/MIN/1.73M2
GLUCOSE BLD-MCNC: 87 MG/DL (ref 70–99)
GLUCOSE BLDC GLUCOMTR-MCNC: 101 MG/DL (ref 70–99)
HCT VFR BLD AUTO: 41 % (ref 35–47)
HGB BLD-MCNC: 13.3 G/DL (ref 11.7–15.7)
INR PPP: 0.95 (ref 0.85–1.15)
LIPASE SERPL-CCNC: 91 U/L (ref 73–393)
MCH RBC QN AUTO: 30 PG (ref 26.5–33)
MCHC RBC AUTO-ENTMCNC: 32.4 G/DL (ref 31.5–36.5)
MCV RBC AUTO: 92 FL (ref 78–100)
PLATELET # BLD AUTO: 232 10E3/UL (ref 150–450)
POTASSIUM BLD-SCNC: 3.7 MMOL/L (ref 3.4–5.3)
PROT SERPL-MCNC: 6.6 G/DL (ref 6.8–8.8)
RBC # BLD AUTO: 4.44 10E6/UL (ref 3.8–5.2)
SODIUM SERPL-SCNC: 143 MMOL/L (ref 133–144)
UPPER EUS: NORMAL
WBC # BLD AUTO: 4.9 10E3/UL (ref 4–11)

## 2022-06-07 PROCEDURE — 82962 GLUCOSE BLOOD TEST: CPT

## 2022-06-07 PROCEDURE — 360N000083 HC SURGERY LEVEL 3 W/ FLUORO, PER MIN: Performed by: INTERNAL MEDICINE

## 2022-06-07 PROCEDURE — C1877 STENT, NON-COAT/COV W/O DEL: HCPCS | Performed by: INTERNAL MEDICINE

## 2022-06-07 PROCEDURE — 250N000011 HC RX IP 250 OP 636: Performed by: REGISTERED NURSE

## 2022-06-07 PROCEDURE — C1726 CATH, BAL DIL, NON-VASCULAR: HCPCS | Performed by: INTERNAL MEDICINE

## 2022-06-07 PROCEDURE — 370N000017 HC ANESTHESIA TECHNICAL FEE, PER MIN: Performed by: INTERNAL MEDICINE

## 2022-06-07 PROCEDURE — 85027 COMPLETE CBC AUTOMATED: CPT | Performed by: INTERNAL MEDICINE

## 2022-06-07 PROCEDURE — 250N000009 HC RX 250: Performed by: REGISTERED NURSE

## 2022-06-07 PROCEDURE — 85610 PROTHROMBIN TIME: CPT | Performed by: INTERNAL MEDICINE

## 2022-06-07 PROCEDURE — 999N000141 HC STATISTIC PRE-PROCEDURE NURSING ASSESSMENT: Performed by: INTERNAL MEDICINE

## 2022-06-07 PROCEDURE — 272N000001 HC OR GENERAL SUPPLY STERILE: Performed by: INTERNAL MEDICINE

## 2022-06-07 PROCEDURE — 255N000002 HC RX 255 OP 636: Performed by: INTERNAL MEDICINE

## 2022-06-07 PROCEDURE — 250N000011 HC RX IP 250 OP 636: Performed by: NURSE ANESTHETIST, CERTIFIED REGISTERED

## 2022-06-07 PROCEDURE — 82150 ASSAY OF AMYLASE: CPT | Performed by: INTERNAL MEDICINE

## 2022-06-07 PROCEDURE — 710N000010 HC RECOVERY PHASE 1, LEVEL 2, PER MIN: Performed by: INTERNAL MEDICINE

## 2022-06-07 PROCEDURE — 258N000003 HC RX IP 258 OP 636: Performed by: REGISTERED NURSE

## 2022-06-07 PROCEDURE — 250N000025 HC SEVOFLURANE, PER MIN: Performed by: INTERNAL MEDICINE

## 2022-06-07 PROCEDURE — 80053 COMPREHEN METABOLIC PANEL: CPT | Performed by: INTERNAL MEDICINE

## 2022-06-07 PROCEDURE — 258N000003 HC RX IP 258 OP 636: Performed by: NURSE ANESTHETIST, CERTIFIED REGISTERED

## 2022-06-07 PROCEDURE — 83690 ASSAY OF LIPASE: CPT | Performed by: INTERNAL MEDICINE

## 2022-06-07 PROCEDURE — 250N000009 HC RX 250: Performed by: INTERNAL MEDICINE

## 2022-06-07 PROCEDURE — 250N000009 HC RX 250: Performed by: NURSE ANESTHETIST, CERTIFIED REGISTERED

## 2022-06-07 PROCEDURE — C1769 GUIDE WIRE: HCPCS | Performed by: INTERNAL MEDICINE

## 2022-06-07 PROCEDURE — 258N000003 HC RX IP 258 OP 636: Performed by: INTERNAL MEDICINE

## 2022-06-07 PROCEDURE — 710N000012 HC RECOVERY PHASE 2, PER MINUTE: Performed by: INTERNAL MEDICINE

## 2022-06-07 PROCEDURE — 250N000011 HC RX IP 250 OP 636: Performed by: STUDENT IN AN ORGANIZED HEALTH CARE EDUCATION/TRAINING PROGRAM

## 2022-06-07 DEVICE — STENT FREEMAN PANCREA FLEX 4FRX11CM W/O FLANGE SGL PIGTAIL: Type: IMPLANTABLE DEVICE | Site: PANCREATIC DUCT | Status: FUNCTIONAL

## 2022-06-07 RX ORDER — NALOXONE HYDROCHLORIDE 0.4 MG/ML
0.4 INJECTION, SOLUTION INTRAMUSCULAR; INTRAVENOUS; SUBCUTANEOUS
Status: DISCONTINUED | OUTPATIENT
Start: 2022-06-07 | End: 2022-06-07 | Stop reason: HOSPADM

## 2022-06-07 RX ORDER — ONDANSETRON 4 MG/1
4 TABLET, ORALLY DISINTEGRATING ORAL EVERY 30 MIN PRN
Status: DISCONTINUED | OUTPATIENT
Start: 2022-06-07 | End: 2022-06-07 | Stop reason: HOSPADM

## 2022-06-07 RX ORDER — LIDOCAINE 40 MG/G
CREAM TOPICAL
Status: DISCONTINUED | OUTPATIENT
Start: 2022-06-07 | End: 2022-06-07 | Stop reason: HOSPADM

## 2022-06-07 RX ORDER — FENTANYL CITRATE 50 UG/ML
INJECTION, SOLUTION INTRAMUSCULAR; INTRAVENOUS PRN
Status: DISCONTINUED | OUTPATIENT
Start: 2022-06-07 | End: 2022-06-07

## 2022-06-07 RX ORDER — GLYCOPYRROLATE 0.2 MG/ML
INJECTION, SOLUTION INTRAMUSCULAR; INTRAVENOUS PRN
Status: DISCONTINUED | OUTPATIENT
Start: 2022-06-07 | End: 2022-06-07

## 2022-06-07 RX ORDER — INDOMETHACIN 50 MG/1
SUPPOSITORY RECTAL PRN
Status: DISCONTINUED | OUTPATIENT
Start: 2022-06-07 | End: 2022-06-07 | Stop reason: HOSPADM

## 2022-06-07 RX ORDER — NALOXONE HYDROCHLORIDE 0.4 MG/ML
0.2 INJECTION, SOLUTION INTRAMUSCULAR; INTRAVENOUS; SUBCUTANEOUS
Status: DISCONTINUED | OUTPATIENT
Start: 2022-06-07 | End: 2022-06-07 | Stop reason: HOSPADM

## 2022-06-07 RX ORDER — PROPOFOL 10 MG/ML
INJECTION, EMULSION INTRAVENOUS PRN
Status: DISCONTINUED | OUTPATIENT
Start: 2022-06-07 | End: 2022-06-07

## 2022-06-07 RX ORDER — HALOPERIDOL 5 MG/ML
1 INJECTION INTRAMUSCULAR
Status: DISCONTINUED | OUTPATIENT
Start: 2022-06-07 | End: 2022-06-07 | Stop reason: HOSPADM

## 2022-06-07 RX ORDER — ONDANSETRON 2 MG/ML
INJECTION INTRAMUSCULAR; INTRAVENOUS PRN
Status: DISCONTINUED | OUTPATIENT
Start: 2022-06-07 | End: 2022-06-07

## 2022-06-07 RX ORDER — IOPAMIDOL 510 MG/ML
INJECTION, SOLUTION INTRAVASCULAR PRN
Status: DISCONTINUED | OUTPATIENT
Start: 2022-06-07 | End: 2022-06-07 | Stop reason: HOSPADM

## 2022-06-07 RX ORDER — SODIUM CHLORIDE, SODIUM LACTATE, POTASSIUM CHLORIDE, CALCIUM CHLORIDE 600; 310; 30; 20 MG/100ML; MG/100ML; MG/100ML; MG/100ML
INJECTION, SOLUTION INTRAVENOUS CONTINUOUS PRN
Status: DISCONTINUED | OUTPATIENT
Start: 2022-06-07 | End: 2022-06-07

## 2022-06-07 RX ORDER — ACETAMINOPHEN 325 MG/1
975 TABLET ORAL ONCE
Status: DISCONTINUED | OUTPATIENT
Start: 2022-06-07 | End: 2022-06-07 | Stop reason: HOSPADM

## 2022-06-07 RX ORDER — LABETALOL HYDROCHLORIDE 5 MG/ML
10 INJECTION, SOLUTION INTRAVENOUS
Status: DISCONTINUED | OUTPATIENT
Start: 2022-06-07 | End: 2022-06-07 | Stop reason: HOSPADM

## 2022-06-07 RX ORDER — SODIUM CHLORIDE, SODIUM LACTATE, POTASSIUM CHLORIDE, CALCIUM CHLORIDE 600; 310; 30; 20 MG/100ML; MG/100ML; MG/100ML; MG/100ML
INJECTION, SOLUTION INTRAVENOUS CONTINUOUS
Status: DISCONTINUED | OUTPATIENT
Start: 2022-06-07 | End: 2022-06-07 | Stop reason: HOSPADM

## 2022-06-07 RX ORDER — ONDANSETRON 2 MG/ML
4 INJECTION INTRAMUSCULAR; INTRAVENOUS EVERY 30 MIN PRN
Status: DISCONTINUED | OUTPATIENT
Start: 2022-06-07 | End: 2022-06-07 | Stop reason: HOSPADM

## 2022-06-07 RX ORDER — DEXAMETHASONE SODIUM PHOSPHATE 4 MG/ML
INJECTION, SOLUTION INTRA-ARTICULAR; INTRALESIONAL; INTRAMUSCULAR; INTRAVENOUS; SOFT TISSUE PRN
Status: DISCONTINUED | OUTPATIENT
Start: 2022-06-07 | End: 2022-06-07

## 2022-06-07 RX ORDER — MEPERIDINE HYDROCHLORIDE 25 MG/ML
12.5 INJECTION INTRAMUSCULAR; INTRAVENOUS; SUBCUTANEOUS
Status: DISCONTINUED | OUTPATIENT
Start: 2022-06-07 | End: 2022-06-07 | Stop reason: HOSPADM

## 2022-06-07 RX ORDER — FENTANYL CITRATE 50 UG/ML
25 INJECTION, SOLUTION INTRAMUSCULAR; INTRAVENOUS EVERY 5 MIN PRN
Status: DISCONTINUED | OUTPATIENT
Start: 2022-06-07 | End: 2022-06-07 | Stop reason: HOSPADM

## 2022-06-07 RX ORDER — PANTOPRAZOLE SODIUM 40 MG/1
40 TABLET, DELAYED RELEASE ORAL 2 TIMES DAILY
Qty: 60 TABLET | Refills: 1 | Status: SHIPPED | OUTPATIENT
Start: 2022-06-07 | End: 2022-07-07

## 2022-06-07 RX ORDER — FENTANYL CITRATE 50 UG/ML
25 INJECTION, SOLUTION INTRAMUSCULAR; INTRAVENOUS
Status: DISCONTINUED | OUTPATIENT
Start: 2022-06-07 | End: 2022-06-07 | Stop reason: HOSPADM

## 2022-06-07 RX ORDER — HYDRALAZINE HYDROCHLORIDE 20 MG/ML
2.5-5 INJECTION INTRAMUSCULAR; INTRAVENOUS EVERY 10 MIN PRN
Status: DISCONTINUED | OUTPATIENT
Start: 2022-06-07 | End: 2022-06-07 | Stop reason: HOSPADM

## 2022-06-07 RX ORDER — EPHEDRINE SULFATE 50 MG/ML
INJECTION, SOLUTION INTRAMUSCULAR; INTRAVENOUS; SUBCUTANEOUS PRN
Status: DISCONTINUED | OUTPATIENT
Start: 2022-06-07 | End: 2022-06-07

## 2022-06-07 RX ORDER — ONDANSETRON 2 MG/ML
4 INJECTION INTRAMUSCULAR; INTRAVENOUS EVERY 6 HOURS PRN
Status: DISCONTINUED | OUTPATIENT
Start: 2022-06-07 | End: 2022-06-07 | Stop reason: HOSPADM

## 2022-06-07 RX ORDER — OXYCODONE HYDROCHLORIDE 5 MG/1
5 TABLET ORAL EVERY 4 HOURS PRN
Status: DISCONTINUED | OUTPATIENT
Start: 2022-06-07 | End: 2022-06-07 | Stop reason: HOSPADM

## 2022-06-07 RX ORDER — DIMENHYDRINATE 50 MG/ML
25 INJECTION, SOLUTION INTRAMUSCULAR; INTRAVENOUS
Status: DISCONTINUED | OUTPATIENT
Start: 2022-06-07 | End: 2022-06-07 | Stop reason: HOSPADM

## 2022-06-07 RX ORDER — HYDROMORPHONE HCL IN WATER/PF 6 MG/30 ML
0.2 PATIENT CONTROLLED ANALGESIA SYRINGE INTRAVENOUS EVERY 5 MIN PRN
Status: DISCONTINUED | OUTPATIENT
Start: 2022-06-07 | End: 2022-06-07 | Stop reason: HOSPADM

## 2022-06-07 RX ORDER — FLUMAZENIL 0.1 MG/ML
0.2 INJECTION, SOLUTION INTRAVENOUS
Status: DISCONTINUED | OUTPATIENT
Start: 2022-06-07 | End: 2022-06-07 | Stop reason: HOSPADM

## 2022-06-07 RX ORDER — ONDANSETRON 4 MG/1
4 TABLET, ORALLY DISINTEGRATING ORAL EVERY 6 HOURS PRN
Status: DISCONTINUED | OUTPATIENT
Start: 2022-06-07 | End: 2022-06-07 | Stop reason: HOSPADM

## 2022-06-07 RX ADMIN — ONDANSETRON 4 MG: 2 INJECTION INTRAMUSCULAR; INTRAVENOUS at 14:20

## 2022-06-07 RX ADMIN — SODIUM CHLORIDE, POTASSIUM CHLORIDE, SODIUM LACTATE AND CALCIUM CHLORIDE: 600; 310; 30; 20 INJECTION, SOLUTION INTRAVENOUS at 12:12

## 2022-06-07 RX ADMIN — Medication 10 MG: at 13:17

## 2022-06-07 RX ADMIN — Medication 30 MG: at 12:25

## 2022-06-07 RX ADMIN — GLYCOPYRROLATE 0.1 MG: 0.2 INJECTION, SOLUTION INTRAMUSCULAR; INTRAVENOUS at 12:25

## 2022-06-07 RX ADMIN — SUGAMMADEX 200 MG: 100 INJECTION, SOLUTION INTRAVENOUS at 13:32

## 2022-06-07 RX ADMIN — GLYCOPYRROLATE 0.1 MG: 0.2 INJECTION, SOLUTION INTRAMUSCULAR; INTRAVENOUS at 13:22

## 2022-06-07 RX ADMIN — PROPOFOL 100 MG: 10 INJECTION, EMULSION INTRAVENOUS at 12:25

## 2022-06-07 RX ADMIN — FENTANYL CITRATE 100 MCG: 50 INJECTION, SOLUTION INTRAMUSCULAR; INTRAVENOUS at 12:25

## 2022-06-07 RX ADMIN — PHENYLEPHRINE HYDROCHLORIDE 100 MCG: 10 INJECTION INTRAVENOUS at 13:17

## 2022-06-07 RX ADMIN — SODIUM CHLORIDE, POTASSIUM CHLORIDE, SODIUM LACTATE AND CALCIUM CHLORIDE 1000 ML: 600; 310; 30; 20 INJECTION, SOLUTION INTRAVENOUS at 14:21

## 2022-06-07 RX ADMIN — DEXAMETHASONE SODIUM PHOSPHATE 4 MG: 4 INJECTION, SOLUTION INTRA-ARTICULAR; INTRALESIONAL; INTRAMUSCULAR; INTRAVENOUS; SOFT TISSUE at 12:49

## 2022-06-07 RX ADMIN — PHENYLEPHRINE HYDROCHLORIDE 200 MCG: 10 INJECTION INTRAVENOUS at 13:19

## 2022-06-07 RX ADMIN — ONDANSETRON 4 MG: 2 INJECTION INTRAMUSCULAR; INTRAVENOUS at 13:31

## 2022-06-07 NOTE — ANESTHESIA POSTPROCEDURE EVALUATION
Patient: Prabha Bowling    Procedure: Procedure(s):  ENDOSCOPIC ULTRASOUND, ESOPHAGOSCOPY / UPPER GASTROINTESTINAL TRACT (GI)  ENDOSCOPIC RETROGRADE CHOLANGIOPANCREATOGRAPHY with pancreatic duct balloon dilation, balloon sweep of pancreatic duct for sludge and pancreatic duct stent placed       Anesthesia Type:  General    Note:  Disposition: Outpatient   Postop Pain Control: Uneventful            Sign Out: Well controlled pain   PONV: No   Neuro/Psych: Uneventful            Sign Out: Acceptable/Baseline neuro status   Airway/Respiratory: Uneventful            Sign Out: Acceptable/Baseline resp. status   CV/Hemodynamics: Uneventful            Sign Out: Acceptable CV status; No obvious hypovolemia; No obvious fluid overload   Other NRE: NONE   DID A NON-ROUTINE EVENT OCCUR? No           Last vitals:  Vitals Value Taken Time   /74 06/07/22 1415   Temp 35.8  C (96.4  F) 06/07/22 1345   Pulse 72 06/07/22 1431   Resp 6 06/07/22 1431   SpO2 97 % 06/07/22 1431   Vitals shown include unvalidated device data.    Electronically Signed By: Amanda Qureshi MD  June 7, 2022  2:31 PM

## 2022-06-07 NOTE — PROGRESS NOTES
Paged Dr. Trevino-The CBC, BMP, and INR were not drawn in PACU. Did you want those drawn prior to discharge?  Cammie *[phone]: Dr Trevino called back and stated no labs needed to be drawn.

## 2022-06-07 NOTE — ANESTHESIA CARE TRANSFER NOTE
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    Anesthesia Type:   General     Note:    Oropharynx: oropharynx clear of all foreign objects  Level of Consciousness: drowsy  Oxygen Supplementation: face mask  Level of Supplemental Oxygen (L/min / FiO2): 6  Independent Airway: airway patency satisfactory and stable  Dentition: dentition unchanged  Vital Signs Stable: post-procedure vital signs reviewed and stable  Report to RN Given: handoff report given  Patient transferred to: PACU    Handoff Report: Identifed the Patient, Identified the Reponsible Provider, Reviewed the pertinent medical history, Discussed the surgical course, Reviewed Intra-OP anesthesia mangement and issues during anesthesia, Set expectations for post-procedure period and Allowed opportunity for questions and acknowledgement of understanding      Vitals:  Vitals Value Taken Time   /84 06/07/22 1345   Temp 35.8  C (96.4  F) 06/07/22 1345   Pulse 75 06/07/22 1349   Resp 13 06/07/22 1349   SpO2 99 % 06/07/22 1349   Vitals shown include unvalidated device data.    Electronically Signed By: ANGELIA Jacobson CRNA  June 7, 2022  1:51 PM

## 2022-06-07 NOTE — PROGRESS NOTES
Dr Trevino by to speak to [employer] and stated that she didn't need any postoperative labs and was okay to discharge. SMR

## 2022-06-07 NOTE — ANESTHESIA PREPROCEDURE EVALUATION
Anesthesia Pre-Procedure Evaluation    Patient: Prabha Bowling   MRN: [id number] : 1945        Procedure : Procedure(s):  ENDOSCOPIC ULTRASOUND, ESOPHAGOSCOPY / UPPER GASTROINTESTINAL TRACT (GI)  ENDOSCOPIC RETROGRADE CHOLANGIOPANCREATOGRAPHY          Past Medical History:   Diagnosis Date     Allergic rhinitis      Constipation      Gastro-oesophageal reflux disease      Hypertension      Multiple sclerosis (H)      Osteoporosis      Sphincter of Oddi dysfunction      Urinary incontinence       Past Surgical History:   Procedure Laterality Date     bilateral breast biopsies[       bilateral cataract extractions[       CHOLECYSTECTOMY       COLONOSCOPY       ENDOSCOPIC RETROGRADE CHOLANGIOPANCREATOGRAM  2012    Procedure: ENDOSCOPIC RETROGRADE CHOLANGIOPANCREATOGRAM;  ERCP with biliary sphincterotomy and biliary stent placement;  Surgeon: Rubén Del Angel MD;  Location: [facility] OR     ENDOSCOPIC RETROGRADE CHOLANGIOPANCREATOGRAM  2013    Procedure: ENDOSCOPIC RETROGRADE CHOLANGIOPANCREATOGRAM;  Endoscopic Retrograde Cholangiopancreatogram , with Biliary Sweeping;  Surgeon: Rubén Del Angel MD;  Location: [facility]      [facility] ENDOSCOPY W EUS  2013    Procedure: COMBINED ENDOSCOPIC ULTRASOUND, ESOPHAGOSCOPY, GASTROSCOPY, DUODENOSCOPY (EGD);  Surgeon: Ita Gong MD;  Location: [facility] GI     HYSTERECTOMY      with removal one ovary     TONSILLECTOMY        Allergies   Allergen Reactions     Alendronic Acid GI Disturbance and Nausea and Vomiting     Codeine Sulfate       Social History     Tobacco Use     Smoking status: Never Smoker     Smokeless tobacco: Never Used   Substance Use Topics     Alcohol use: No      Wt Readings from Last 1 Encounters:   13 73.2 kg (161 lb 6 oz)        Anesthesia Evaluation   Pt has had prior anesthetic. Type: General.    No history of anesthetic complications       ROS/MED HX  ENT/Pulmonary:  - neg pulmonary ROS     Neurologic: Comment:   On Baclofen  20mg BID    (+) Multiple Sclerosis, limitations: weakness LE, uses wheelchair or walker. Difficulty lifting herself from supine/sitting position. No pain. .     Cardiovascular:     (+) Dyslipidemia hypertension-range: DOS: 196/86 / ----    METS/Exercise Tolerance:     Hematologic:  - neg hematologic  ROS     Musculoskeletal:  - neg musculoskeletal ROS     GI/Hepatic:     (+) GERD, hiatal hernia,     Renal/Genitourinary:  - neg Renal ROS     Endo:  - neg endo ROS     Psychiatric/Substance Use:  - neg psychiatric ROS     Infectious Disease:  - neg infectious disease ROS     Malignancy:       Other:  - neg other ROS          Physical Exam    Airway  airway exam normal      Mallampati: II   TM distance: > 3 FB   Neck ROM: full   Mouth opening: > 3 cm    Respiratory Devices and Support         Dental  no notable dental history         Cardiovascular   cardiovascular exam normal          Pulmonary   pulmonary exam normal        breath sounds clear to auscultation           OUTSIDE LABS:  CBC:   Lab Results   Component Value Date    WBC 5.9 05/07/2013    WBC 5.9 08/30/2012    HGB 12.9 05/07/2013    HGB 12.3 08/30/2012    HCT 38.6 05/07/2013    HCT 38.0 08/30/2012     05/07/2013     08/30/2012     BMP:   Lab Results   Component Value Date     08/30/2012     (H) 10/07/2010    POTASSIUM 3.9 08/30/2012    POTASSIUM 3.7 10/07/2010    CHLORIDE 106 08/30/2012    CHLORIDE 111 (H) 10/07/2010    CO2 29 08/30/2012    CO2 25 10/07/2010    BUN 21 08/30/2012    BUN 21 10/07/2010    CR 0.58 08/30/2012    CR 0.68 10/07/2010    GLC 99 08/30/2012     (H) 10/07/2010     COAGS:   Lab Results   Component Value Date    INR 0.96 05/07/2013     POC: No results found for: BGM, HCG, HCGS  HEPATIC:   Lab Results   Component Value Date    ALBUMIN 3.7 08/30/2012    PROTTOTAL 6.7 (L) 08/30/2012    ALT 24 08/30/2012    AST 25 08/30/2012    GGT 67 (H) 01/26/2006    ALKPHOS 102 08/30/2012    BILITOTAL 0.5 08/30/2012      OTHER:   Lab Results   Component Value Date    OMI 8.9 08/30/2012    LIPASE 78 08/30/2012    AMYLASE 70 08/30/2012    TSH 0.25 (L) 12/27/2005    SED 17 12/27/2005       Anesthesia Plan    ASA Status:  3      Anesthesia Type: General.     - Airway: ETT   Induction: Intravenous.   Maintenance: Inhalation.   Techniques and Equipment:     - Lines/Monitors: BIS (Twitch View 2 monitor. )     Consents    Anesthesia Plan(s) and associated risks, benefits, and realistic alternatives discussed. Questions answered and patient/representative(s) expressed understanding.    - Discussed:     - Discussed with:  Patient      - Extended Intubation/Ventilatory Support Discussed: No.      - Patient is DNR/DNI Status: No    Use of blood products discussed: No .     Postoperative Care    Pain management: IV analgesics, Multi-modal analgesia, Oral pain medications.   PONV prophylaxis: Ondansetron (or other 5HT-3), Dexamethasone or Solumedrol     Comments:                Jaida Hart MD

## 2022-06-07 NOTE — ANESTHESIA PROCEDURE NOTES
Airway       Patient location during procedure: OR       Procedure Start/Stop Times: 6/7/2022 12:30 PM  Staff -        CRNA: Radha Marquez APRN CRNA       Performed By: CRNA  Consent for Airway        Urgency: elective  Indications and Patient Condition       Indications for airway management: scott-procedural       Induction type:intravenous       Mask difficulty assessment: 1 - vent by mask    Final Airway Details       Final airway type: endotracheal airway       Successful airway: ETT - single and Oral  Endotracheal Airway Details        ETT size (mm): 7.0       Cuffed: yes       Successful intubation technique: direct laryngoscopy       DL Blade Type: Jones 2       Grade View of Cords: 2       Adjucts: stylet       Position: Right       Measured from: gums/teeth       Secured at (cm): 21       Bite Block used: endoscopy bite block.    Post intubation assessment        Placement verified by: capnometry, equal breath sounds and chest rise        Number of attempts at approach: 1       Number of other approaches attempted: 0       Secured with: plastic tape       Ease of procedure: easy       Dentition: Intact and Unchanged    Medication(s) Administered   Medication Administration Time: 6/7/2022 12:30 PM

## 2022-06-07 NOTE — OP NOTE
Upper EUS 06/07/2022 12:21 PM [address] [place] [address] [phone]     Endoscopy Department   _______________________________________________________________________________   Patient Name: Prabha Bowling           Procedure Date: 6/7/2022 12:21 PM   MRN: [id number]                       Account Number: [id number]   YOB: 1945              Admit Type: Outpatient   Age: 77                               Room: [place] [address]   Gender: Female                        Note Status: Finalized   Attending MD: IVETT MONTALVO MD  Total Sedation Time:   _______________________________________________________________________________       Procedure:             Upper EUS   Indications:           Abnormal MRCP   Providers:             IVETT MONTALVO MD, ASHLIE FERRER MD   Patient Profile:       Ms Bowling is a 76yo woman with a history of sphincter                          of Oddi diseaes which in the past responded to biliary                          sphincterotomy returns with intermittent pain and                          coincident liver study elevation presents for                          evaluation by EUS prior to tandem ERCP in the setting                          of pancreatic atrophy and mild main duct dilation on                          MRCP.   Referring MD:          NASIM GROVES MD   Requesting Provider:   RONIT ENGLISH MD   Medicines:             General Anesthesia   Complications:         No immediate complications.   _______________________________________________________________________________   Procedure:             Pre-Anesthesia Assessment:                          - Prior to the procedure, a History and Physical was                          performed, and patient medications and allergies were                          reviewed. The patient is competent. The risks and                          benefits of  the procedure and the sedation options and                          risks were discussed with the patient. All questions                          were answered and informed consent was obtained.                          Patient identification and proposed procedure were                          verified by the nurse in the pre-procedure area.                          Mental Status Examination: alert and oriented. Airway                          Examination: normal oropharyngeal airway and neck                          mobility. Respiratory Examination: clear to                          auscultation. CV Examination: systolic murmur. ASA                          Grade Assessment: II - A patient with mild systemic                          disease. After reviewing the risks and benefits, the                          patient was deemed in satisfactory condition to                          undergo the procedure. The anesthesia plan was to use                          general anesthesia. Immediately prior to                          administration of medications, the patient was                          re-assessed for adequacy to receive sedatives. The                          heart rate, respiratory rate, oxygen saturations,                          blood pressure, adequacy of pulmonary ventilation, and                          response to care were monitored throughout the                          procedure. The physical status of the patient was                          re-assessed after the procedure. After obtaining                          informed consent, the endoscope was passed under                          direct vision. Throughout the procedure, the patient's                          blood pressure, pulse, and oxygen saturations were                          monitored continuously. The echoendoscope was                          introduced through the mouth, and advanced to the                          second  part of duodenum. The was introduced through                          the mouth, and advanced to the second part of                          duodenum. The upper EUS was accomplished without                          difficulty. The patient tolerated the procedure well.                                                                                     Findings:        White light and endosonographic findings :        We began with a gastroscope for white light evaluation. The proximal,        mid and distal esophagus were unremarkable with the squamocolumnar line        found without significant irregularity at the gastroesophageal junction.        There stomach was notable for scattered antral erosions, though no other        lesion or inflammation was found within the stomach or the proximal        sweep. We then exchanged for a linear rechoendoscope. The pancreatic        parenchyma was diffusely homogeneous without foci, strands or lobularity        throughout, including views of the head where it was noted on imaging to        have atrophy, though this subjected finding was not appreciated during        this evaluation. There were no solid or liquid pancreatic lesions. The        main duct was traced from head to tail and was found to be ventral        dominant (no divisum) with mild dilation in head up to 4.1mm which        persisted in the neck and tapered to 1.5mm in the body and more so in        the tail. There were no dilated side branches, nor was there suggestion        of main duct stone. The gallbladder appeard absent. The common duct was        traced from bifurcaiton to ampulla and was found to be less than 10mm        throughout, though there was suggestion of small mobile debris without        shadowing. No biliary wall thickening was appreciated. Limited views of        the left lobe and right lobe were without suspicious lesion. There was        no evidence of peripancreatic, perigastric,  periportal or celiac region        lymphadenopathy. The major vasculature of the abdomen including the        splenics, superior mesenterics, gastroduodenal, portal and celiac trunk        appeared traditional.                                                                                    Impression:            - No evidence of solid or liquid pancreatic mass with                          grossly homogeneous pancreatic parenchyma and minimal                          dilation (possibly age related) of the main duct up to                          4mm in the head                          - No significant dilation of the common duct                          appreciated (less than 10mm post cholecystectomy)                          though there was suggestion of debris and sludge                          - No evidence of lympadenopathy or vascular abnormality   Recommendation:        - Proceed with ERCP as scheduled to clear debris and                          sludge and to consider extension sphinctetomy                          - The findings and recommendations were discussed with                          the patient and their family                                                                                      electronically signed by MARIXA Quintanilla

## 2022-06-07 NOTE — DISCHARGE INSTRUCTIONS
[facility], [place]  Same-Day Surgery ERCP Procedure   Adult Discharge Orders & Instructions     You had a procedure known as an Endoscopic Retrograde Cholangiopancreatography (ERCP). Your healthcare provider performed the ERCP to look at your bile or pancreatic ducts, and to locate and/or treat blockages (dilation, stenting, removal, etc.) in these ducts. Often biopsies, small samples of tissue, are taken to help diagnose and/or classify stages of disease growth. This procedure is used to diagnose diseases of the pancreas, bile ducts, pancreatic duct, liver, and gallbladder.     After your procedure   Make sure to clarify with your healthcare provider any diet restrictions (For example, clear liquid, low fat, no caffeine, etc.)   Do NOT take aspirin containing medications or any other blood-thinning medicines (anticoagulants) until your healthcare provider says it's OK.   You MAY be prescribed antibiotics, depending on what was done and/or found during your EUS, make sure to take antibiotics as prescribed by your healthcare provider    For 24 hours after surgery  Get plenty of rest.  A responsible adult must stay with you for at least 24 hours after you leave the hospital.   Do not drive or use heavy equipment.  If you have weakness or tingling, don't drive or use heavy equipment until this feeling goes away.  Do not drink alcohol.  Avoid strenuous or risky activities (gym, yoga, cycling, etc.).  Ask for help when climbing stairs.   You may feel lightheaded.  IF so, sit for a few minutes before standing.  Have someone help you get up.   If you have nausea (feel sick to your stomach): Drink only clear liquids such as apple juice, ginger ale, broth or 7-Up.  Rest may also help.  Be sure to drink enough fluids.  Move to a regular diet as you feel able.  If you feel bloated or have too much gas, use a heating pad on your belly to help reduce the discomfort. This should help you feel better.    You may have a slight fever. This is normal for the first 24 hours.   You may have a dry mouth, a sore throat, muscle aches or trouble sleeping.  These are normal and will go away after 24 hours. A sore throat is most common. Use lozenges or gargle with salt water to ease the discomfort.   Do not make important or legal decisions.      Call your doctor for any of the following:  Chest pain, and/or shortness of breath  Abdominal  pain, bloating or cramping that has not improved or does not respond to pain reliving medications (Tylenol or narcotics if prescribed)   Difficulty swallowing or feeling as though food or liquids are stuck in your throat   Sore throat lasting more than 2 days or pain that has worsened over time   Black or tarry stools   Nausea and/or vomiting that is not resolving or has not responded to anti-nausea medications prescribed to you   It has been over 8 to 10 hours since surgery and you are still not able to urinate (pass water)   Headache for over 24 hours   Fever over 100.5 F (38 C) lasting more than 24 hours after the procedure   Signs of jaundice or blockage (fever, chills, abdominal pain, yellowing of the whites of your eyes, yellowing of your skin, and/or passing darker than normal urine)     To contact a doctor, call:   [ ]  Dr Quintanilla at [phone] (General Surgery clinic) (Monday thru Friday 8:00am to 4:30pm)   [ ] [phone] and ask for the General surgery resident on call (answered 24 hours a day)   [ ] Emergency Department: [facility]: [phone]     Take it easy when you get home.  Remember, same day surgery DOES NOT MEAN SAME DAY RECOVERY!  Healing is a gradual process.  You will need some time to recover - you may be more tired than you realize at first.  Rest and relax for at least the first 24 hours at home.  You'll feel better and heal faster if you take good care of yourself.

## 2022-06-08 LAB — ERCP: NORMAL

## 2022-06-13 ENCOUNTER — DOCUMENTATION ONLY (OUTPATIENT)
Dept: GASTROENTEROLOGY | Facility: CLINIC | Age: 77
End: 2022-06-13
Payer: COMMERCIAL

## 2022-06-13 ENCOUNTER — PATIENT OUTREACH (OUTPATIENT)
Dept: GASTROENTEROLOGY | Facility: CLINIC | Age: 77
End: 2022-06-13
Payer: COMMERCIAL

## 2022-06-13 DIAGNOSIS — Z46.89 ENCOUNTER FOR REMOVAL OF PANCREATIC STENT: Primary | ICD-10-CM

## 2022-06-13 NOTE — TELEPHONE ENCOUNTER
Post EUS/ERCP on 6/7/22  Increase pantoprazole to 40 mg BID given gastric  antral erosions seen on upper endoscopy earlier today.       - Obtain a KUB in 4 weeks to ensure spontanous passage   of pancreatic stent. If still in place will need an  EGD in unit J for stent removal.     - Return to GI clinic with Dr. Del Angel in 2 months.     Will send xray to PCP. Pt says she has to watch what she eats, reviewed increased PPI, clinic scheduled w/ Dr. Del Angel    ML

## 2022-06-13 NOTE — PROGRESS NOTES
Faxed Xray order dated 6/13/2022 by Dr. David to [facility] fax number [phone]    Ivory Sanz [facility]

## 2022-06-23 ENCOUNTER — TELEPHONE (OUTPATIENT)
Dept: GASTROENTEROLOGY | Facility: CLINIC | Age: 77
End: 2022-06-23

## 2022-06-23 NOTE — TELEPHONE ENCOUNTER
[facility] Call Center    Phone Message    May a detailed message be left on voicemail: yes     Reason for Call: Other:     Pt missed a call from the clinic and is requesting a call back. Pt stated they generally does not answer the phone if it is an unrecognized number and to please leave message.    Action Taken: Message routed to:  [facility] ([facility]): Panc/Bili    Travel Screening: Not Applicable

## 2022-06-24 ENCOUNTER — TRANSCRIBE ORDERS (OUTPATIENT)
Dept: OTHER | Age: 77
End: 2022-06-24

## 2022-06-24 DIAGNOSIS — M62.81 MUSCLE WEAKNESS: Primary | ICD-10-CM

## 2022-06-24 NOTE — TELEPHONE ENCOUNTER
Returned call, unsure who called yesterday    Reviewed procedure note and follow up, pt understands plan for increased PPI, xray and clinic with Dr. Bean GUERRA

## 2022-06-30 ENCOUNTER — PATIENT OUTREACH (OUTPATIENT)
Dept: GASTROENTEROLOGY | Facility: CLINIC | Age: 77
End: 2022-06-30

## 2022-06-30 NOTE — PROGRESS NOTES
[facility] has not received xray orders    Previously sent 6/13/22- Faxed Xray order dated 6/13/2022 by Dr. David to PCP fax number [phone]    Called to verify fax #- per clinic should fax orders to:   [phone]    Faxed to # above    ML

## 2022-07-05 ENCOUNTER — DOCUMENTATION ONLY (OUTPATIENT)
Dept: GASTROENTEROLOGY | Facility: CLINIC | Age: 77
End: 2022-07-05

## 2022-07-31 ENCOUNTER — HEALTH MAINTENANCE LETTER (OUTPATIENT)
Age: 77
End: 2022-07-31

## 2022-08-05 ENCOUNTER — PATIENT OUTREACH (OUTPATIENT)
Dept: GASTROENTEROLOGY | Facility: CLINIC | Age: 77
End: 2022-08-05

## 2022-08-05 NOTE — TELEPHONE ENCOUNTER
Returned call regarding clinic visit on 8-8 w/ Dr. Del Angel, left message confirming appointment.  ML

## 2022-08-08 ENCOUNTER — VIRTUAL VISIT (OUTPATIENT)
Dept: GASTROENTEROLOGY | Facility: CLINIC | Age: 77
End: 2022-08-08
Payer: COMMERCIAL

## 2022-08-08 DIAGNOSIS — K83.4 SPHINCTER OF ODDI DYSFUNCTION: Primary | ICD-10-CM

## 2022-08-08 PROCEDURE — 99212 OFFICE O/P EST SF 10 MIN: CPT | Mod: 95 | Performed by: INTERNAL MEDICINE

## 2022-08-08 NOTE — LETTER
8/8/2022         RE: Prabha Bowling  [address]  [place] [facility] [place] [address]        Dear Colleague,    Thank you for referring your patient, Prabha Bowling, to the [facility]. Please see a copy of my visit note below.    PARVEZ is a 77 year old who is being evaluated via a billable video visit.      How would you like to obtain your AVS? MyChart  If the video visit is dropped, the invitation should be resent by: Send to e-mail at: [email or web address] Details    Video Start Time: 4:30 PM    Type of service:  Video Visit    Video End Time:4:30 PM    Originating Location (pt. Location): Home    Distant Location (provider location):  [facility]     Platform used for Video Visit: [employer]     You were on a call for 07 minutes 54 seconds      Doing very well sine ERCP twomonths ago. Stents out. Minimal occasional GI upset, generally feels much better. Concerned about gastric erosions noted on EUS part of exam. We discussed that that is likely due to ASA , and as long as she remains on protonix is unlekly to be a problem.  Will only do follow-up PRN pateint.        Again, thank you for allowing me to participate in the care of your patient.        Sincerely,        Rubén Del Angel MD

## 2022-08-08 NOTE — PROGRESS NOTES
PARVEZ is a 77 year old who is being evaluated via a billable video visit.      How would you like to obtain your AVS? MyChart  If the video visit is dropped, the invitation should be resent by: Send to e-mail at: [email or web address] Details    Video Start Time: 4:30 PM    Type of service:  Video Visit    Video End Time:4:30 PM    Originating Location (pt. Location): Home    Distant Location (provider location):  [facility] [place]     Platform used for Video Visit: [employer]     You were on a call for 07 minutes 54 seconds      Doing very well sine ERCP twomonths ago. Stents out. Minimal occasional GI upset, generally feels much better. Concerned about gastric erosions noted on EUS part of exam. We discussed that that is likely due to ASA , and as long as she remains on protonix is unlekly to be a problem.  Will only do follow-up PRN pateint.

## 2022-08-08 NOTE — PATIENT INSTRUCTIONS
Follow up:    Dr. Del Angel has outlined the following steps after your recent clinic visit:    Follow up with us as needed        Please call with any questions or concerns regarding your clinic visit today.    It is a pleasure being involved in your health care.    Contacts post-consultation depending on your need:    Schedule Clinic Appointments            [phone] # 1   M-F 7:30 - 5 pm    Tata Acevedo RN Care Coordinator  [phone]    Maria Luz Perez, RN Care Coordinator [phone]    Sachi Guzman, RN Care Coordinator [phone]     OR Procedure Scheduling                             [phone]    For urgent/emergent questions after business hours, you may reach the on-call GI Fellow by contacting the [facility]  at [phone].    How do I schedule labs, imaging studies, or procedures that were ordered in clinic today?     Labs: To schedule lab appointment at the [facility], use my chart or call [phone]. If you have a [place] lab closer to home where you are regularly seen you can give them a call.     Procedures: If a colonoscopy, upper endoscopy, breath test, esophageal manometry, or pH impedence was ordered today, our endoscopy team will call you to schedule this. If you have not heard from our endoscopy team within a week, please call [phone] to schedule.     Imaging Studies: If you were scheduled for a CT scan, X-ray, MRI, ultrasound, HIDA scan or other imaging study, please call [phone] to have this scheduled.     Referral: If a referral to another specialty was ordered, expect a phone call or follow instructions above. If you have not heard from anyone regarding your referral in a week, please call our clinic to check the status.     How to I schedule a follow-up visit?  If you did not schedule a follow-up visit today, please call [phone] to schedule a follow-up office visit.

## 2022-08-08 NOTE — LETTER
8/8/2022       RE: Prabha Bowling  [address]  [place] [address]     Dear Colleague,    Thank you for referring your patient, Prabha Bowling, to the [facility] [place] [facility]. Please see a copy of my visit note below.      You were on a call for 07 minutes 54 seconds      Doing very well sine ERCP two months ago. Stents out. Minimal occasional GI upset, generally feels much better. Concerned about gastric erosions noted on EUS part of exam. We discussed that that is likely due to ASA , and as long as she remains on protonix is unlikely to be a problem.  Will only do follow-up PRN pateint.          Sincerely,    Rubén Del Angel MD

## 2022-08-09 ENCOUNTER — TELEPHONE (OUTPATIENT)
Dept: GASTROENTEROLOGY | Facility: CLINIC | Age: 77
End: 2022-08-09

## 2022-08-09 DIAGNOSIS — K29.70 GASTRITIS: Primary | ICD-10-CM

## 2022-08-09 RX ORDER — PANTOPRAZOLE SODIUM 40 MG/1
40 TABLET, DELAYED RELEASE ORAL DAILY
Qty: 90 TABLET | Refills: 3 | Status: SHIPPED | OUTPATIENT
Start: 2022-08-09

## 2022-08-09 NOTE — TELEPHONE ENCOUNTER
Health Call Center    Phone Message    May a detailed message be left on voicemail: yes     Reason for Call: Medication Refill Request    Has the patient contacted the pharmacy for the refill? Yes   Name of medication being requested: pantoprazole (PROTONIX) 40 MG EC tablet  Provider who prescribed the medication: Pedro Trevino MD  Pharm[place] [id number] [place] [address]  Date medication is needed: ASAP. The pharmacy could not find , so the patient expressed that  also told her to continue taking this medication at her 08/08/2022 appointment. The pharmacy is trying to put together her month box of medications.         Action Taken: Message routed to:  [facility] Jose ji    Travel Screening: Not Applicable

## 2022-08-09 NOTE — TELEPHONE ENCOUNTER
Per Dr. Del Angel regarding PPI  40mg daily   Just a few asa erosions    Med ordered, sent to preferred pharmacy

## 2022-10-15 ENCOUNTER — HEALTH MAINTENANCE LETTER (OUTPATIENT)
Age: 77
End: 2022-10-15

## 2023-07-13 ENCOUNTER — PATIENT OUTREACH (OUTPATIENT)
Dept: GASTROENTEROLOGY | Facility: CLINIC | Age: 78
End: 2023-07-13
Payer: COMMERCIAL

## 2023-07-13 ENCOUNTER — PREP FOR PROCEDURE (OUTPATIENT)
Dept: GASTROENTEROLOGY | Facility: CLINIC | Age: 78
End: 2023-07-13
Payer: COMMERCIAL

## 2023-07-13 DIAGNOSIS — R79.89 ELEVATED LFTS: Primary | ICD-10-CM

## 2023-07-13 NOTE — TELEPHONE ENCOUNTER
"Patient left message noting return of pain and increased LFTs. Per Dr. Del Angel  She has recurrent sludge. If pain persists would just schedule another ERCP. 30 minutes scope time      Per patient, getting better but since ER Visit, \"its like I'm sick to my stomach whenever I eat or drink, having heart burn and belching\" Per patient, this is the 3rd flare had 2 episodes earlier.     Patient agrees its time for repeat ERCP, asked that I call daughter to coordinate. Called Martina, [phone]  Left message    Please assist in scheduling:     Procedure/Imaging/Clinic: ERCP  Physician: Dr. Del Angel  Timin/10- requests case later in the day  Scope time needed:30 min  Anesthesia:gen  Dx: elevated lfts  Tier:3  Location: [facility]  Header of letter for pt communication: Endoscopic Retrograde Cholangiopancreatography (ERCP)     Orders placed, daughter called back agreed on case on 8/10, prefers late day case.    ML  "

## 2023-07-17 ENCOUNTER — PREP FOR PROCEDURE (OUTPATIENT)
Dept: GASTROENTEROLOGY | Facility: CLINIC | Age: 78
End: 2023-07-17
Payer: COMMERCIAL

## 2023-08-08 RX ORDER — HYDROXYZINE HYDROCHLORIDE 25 MG/1
TABLET, FILM COATED ORAL 2 TIMES DAILY
COMMUNITY
Start: 2023-03-15

## 2023-08-09 ENCOUNTER — ANESTHESIA EVENT (OUTPATIENT)
Dept: SURGERY | Facility: CLINIC | Age: 78
End: 2023-08-09
Payer: MEDICARE

## 2023-08-09 ENCOUNTER — PATIENT OUTREACH (OUTPATIENT)
Dept: GASTROENTEROLOGY | Facility: CLINIC | Age: 78
End: 2023-08-09
Payer: COMMERCIAL

## 2023-08-09 NOTE — TELEPHONE ENCOUNTER
Patient called in noting that she was diagnosed with UTI today and is on Cirpo BID, she'll start that tonight. No other s/sx. States she has a history of bladder ulcers so got checked for that based on urinary sx, UTI found.     Per , patient can stay on as scheduled, patient will come for ERCP as scheduled tomorrow.  ML

## 2023-08-10 ENCOUNTER — HOSPITAL ENCOUNTER (OUTPATIENT)
Facility: CLINIC | Age: 78
Discharge: HOME | End: 2023-08-10
Attending: INTERNAL MEDICINE | Admitting: INTERNAL MEDICINE
Payer: MEDICARE

## 2023-08-10 ENCOUNTER — ANESTHESIA (OUTPATIENT)
Dept: SURGERY | Facility: CLINIC | Age: 78
End: 2023-08-10
Payer: MEDICARE

## 2023-08-10 ENCOUNTER — APPOINTMENT (OUTPATIENT)
Dept: GENERAL RADIOLOGY | Facility: CLINIC | Age: 78
End: 2023-08-10
Attending: INTERNAL MEDICINE
Payer: MEDICARE

## 2023-08-10 VITALS
WEIGHT: 159.39 LBS | RESPIRATION RATE: 14 BRPM | HEART RATE: 62 BPM | SYSTOLIC BLOOD PRESSURE: 145 MMHG | HEIGHT: 62 IN | OXYGEN SATURATION: 97 % | BODY MASS INDEX: 29.33 KG/M2 | DIASTOLIC BLOOD PRESSURE: 80 MMHG | TEMPERATURE: 97.7 F

## 2023-08-10 LAB
ALBUMIN SERPL BCG-MCNC: 4.1 G/DL (ref 3.5–5.2)
ALP SERPL-CCNC: 117 U/L (ref 35–104)
ALT SERPL W P-5'-P-CCNC: 18 U/L (ref 0–50)
AMYLASE SERPL-CCNC: 35 U/L (ref 28–100)
ANION GAP SERPL CALCULATED.3IONS-SCNC: 9 MMOL/L (ref 7–15)
AST SERPL W P-5'-P-CCNC: 23 U/L (ref 0–45)
BILIRUB SERPL-MCNC: 0.6 MG/DL
BUN SERPL-MCNC: 13.2 MG/DL (ref 8–23)
CALCIUM SERPL-MCNC: 9.5 MG/DL (ref 8.8–10.2)
CHLORIDE SERPL-SCNC: 105 MMOL/L (ref 98–107)
CREAT SERPL-MCNC: 0.48 MG/DL (ref 0.51–0.95)
DEPRECATED HCO3 PLAS-SCNC: 26 MMOL/L (ref 22–29)
ERCP: NORMAL
ERYTHROCYTE [DISTWIDTH] IN BLOOD BY AUTOMATED COUNT: 14.5 % (ref 10–15)
GFR SERPL CREATININE-BSD FRML MDRD: >90 ML/MIN/1.73M2
GLUCOSE SERPL-MCNC: 100 MG/DL (ref 70–99)
HCT VFR BLD AUTO: 40.9 % (ref 35–47)
HGB BLD-MCNC: 13.2 G/DL (ref 11.7–15.7)
INR PPP: 1.03 (ref 0.85–1.15)
LIPASE SERPL-CCNC: 20 U/L (ref 13–60)
MCH RBC QN AUTO: 28.9 PG (ref 26.5–33)
MCHC RBC AUTO-ENTMCNC: 32.3 G/DL (ref 31.5–36.5)
MCV RBC AUTO: 90 FL (ref 78–100)
PLATELET # BLD AUTO: 256 10E3/UL (ref 150–450)
POTASSIUM SERPL-SCNC: 3.7 MMOL/L (ref 3.4–5.3)
PROT SERPL-MCNC: 6.5 G/DL (ref 6.4–8.3)
RBC # BLD AUTO: 4.57 10E6/UL (ref 3.8–5.2)
SODIUM SERPL-SCNC: 140 MMOL/L (ref 136–145)
WBC # BLD AUTO: 6.1 10E3/UL (ref 4–11)

## 2023-08-10 PROCEDURE — 250N000025 HC SEVOFLURANE, PER MIN: Performed by: INTERNAL MEDICINE

## 2023-08-10 PROCEDURE — 272N000001 HC OR GENERAL SUPPLY STERILE: Performed by: INTERNAL MEDICINE

## 2023-08-10 PROCEDURE — 258N000003 HC RX IP 258 OP 636: Performed by: ANESTHESIOLOGY

## 2023-08-10 PROCEDURE — 82150 ASSAY OF AMYLASE: CPT | Performed by: INTERNAL MEDICINE

## 2023-08-10 PROCEDURE — 258N000003 HC RX IP 258 OP 636: Performed by: STUDENT IN AN ORGANIZED HEALTH CARE EDUCATION/TRAINING PROGRAM

## 2023-08-10 PROCEDURE — 250N000009 HC RX 250: Performed by: STUDENT IN AN ORGANIZED HEALTH CARE EDUCATION/TRAINING PROGRAM

## 2023-08-10 PROCEDURE — C1726 CATH, BAL DIL, NON-VASCULAR: HCPCS | Performed by: INTERNAL MEDICINE

## 2023-08-10 PROCEDURE — 250N000011 HC RX IP 250 OP 636: Mod: JZ | Performed by: STUDENT IN AN ORGANIZED HEALTH CARE EDUCATION/TRAINING PROGRAM

## 2023-08-10 PROCEDURE — 85610 PROTHROMBIN TIME: CPT | Performed by: INTERNAL MEDICINE

## 2023-08-10 PROCEDURE — 360N000083 HC SURGERY LEVEL 3 W/ FLUORO, PER MIN: Performed by: INTERNAL MEDICINE

## 2023-08-10 PROCEDURE — 710N000012 HC RECOVERY PHASE 2, PER MINUTE: Performed by: INTERNAL MEDICINE

## 2023-08-10 PROCEDURE — 250N000011 HC RX IP 250 OP 636: Mod: JZ | Performed by: ANESTHESIOLOGY

## 2023-08-10 PROCEDURE — 999N000141 HC STATISTIC PRE-PROCEDURE NURSING ASSESSMENT: Performed by: INTERNAL MEDICINE

## 2023-08-10 PROCEDURE — 255N000002 HC RX 255 OP 636: Mod: JZ | Performed by: INTERNAL MEDICINE

## 2023-08-10 PROCEDURE — C1877 STENT, NON-COAT/COV W/O DEL: HCPCS | Performed by: INTERNAL MEDICINE

## 2023-08-10 PROCEDURE — 360N000082 HC SURGERY LEVEL 2 W/ FLUORO, PER MIN: Performed by: INTERNAL MEDICINE

## 2023-08-10 PROCEDURE — 85027 COMPLETE CBC AUTOMATED: CPT | Performed by: INTERNAL MEDICINE

## 2023-08-10 PROCEDURE — C1769 GUIDE WIRE: HCPCS | Performed by: INTERNAL MEDICINE

## 2023-08-10 PROCEDURE — 80053 COMPREHEN METABOLIC PANEL: CPT | Performed by: INTERNAL MEDICINE

## 2023-08-10 PROCEDURE — 250N000009 HC RX 250: Performed by: INTERNAL MEDICINE

## 2023-08-10 PROCEDURE — 370N000017 HC ANESTHESIA TECHNICAL FEE, PER MIN: Performed by: INTERNAL MEDICINE

## 2023-08-10 PROCEDURE — 710N000010 HC RECOVERY PHASE 1, LEVEL 2, PER MIN: Performed by: INTERNAL MEDICINE

## 2023-08-10 PROCEDURE — 999N000181 XR SURGERY CARM FLUORO GREATER THAN 5 MIN W STILLS: Mod: TC

## 2023-08-10 PROCEDURE — 83690 ASSAY OF LIPASE: CPT | Performed by: INTERNAL MEDICINE

## 2023-08-10 DEVICE — A STERILE NON-BIOABSORBABLE TUBULAR DEVICE INTENDED TO BE IMPLANTED IN AN OBSTRUCTED PANCREATIC DUCT (E.G., DUE TO STRICTURE OR MALIGNANCY) TO MAINTAIN LUMINAL PATENCY; IT MAY ALSO BE INTENDED TO TREAT A WIDE VARIETY OF CONDITIONS IN PANCREATIC ENDOSCOPY (E.G., DRAIN PSEUDOCYST, TREAT FISTULA OR DUCT DISRUPTION). IT IS MADE ENTIRELY OF A SYNTHETIC POLYMER AND HAS A CONTINUOUS TUBE DESIGN WITH OR WITHOUT RETENTION FLANGES. THIS IS A SINGLE-USE DEVICE.
Type: IMPLANTABLE DEVICE | Site: PANCREATIC DUCT | Status: FUNCTIONAL
Brand: FREEMAN PANCREATIC FLEXI-STENT

## 2023-08-10 RX ORDER — ONDANSETRON 2 MG/ML
4 INJECTION INTRAMUSCULAR; INTRAVENOUS EVERY 30 MIN PRN
Status: DISCONTINUED | OUTPATIENT
Start: 2023-08-10 | End: 2023-08-10 | Stop reason: HOSPADM

## 2023-08-10 RX ORDER — FENTANYL CITRATE 50 UG/ML
25 INJECTION, SOLUTION INTRAMUSCULAR; INTRAVENOUS EVERY 5 MIN PRN
Status: DISCONTINUED | OUTPATIENT
Start: 2023-08-10 | End: 2023-08-10 | Stop reason: HOSPADM

## 2023-08-10 RX ORDER — CIPROFLOXACIN 2 MG/ML
INJECTION, SOLUTION INTRAVENOUS PRN
Status: DISCONTINUED | OUTPATIENT
Start: 2023-08-10 | End: 2023-08-10

## 2023-08-10 RX ORDER — HEPARIN SODIUM,PORCINE 10 UNIT/ML
5-10 VIAL (ML) INTRAVENOUS EVERY 24 HOURS
Status: DISCONTINUED | OUTPATIENT
Start: 2023-08-10 | End: 2023-08-10 | Stop reason: HOSPADM

## 2023-08-10 RX ORDER — ONDANSETRON 4 MG/1
4 TABLET, ORALLY DISINTEGRATING ORAL EVERY 6 HOURS PRN
Status: DISCONTINUED | OUTPATIENT
Start: 2023-08-10 | End: 2023-08-10 | Stop reason: HOSPADM

## 2023-08-10 RX ORDER — LIDOCAINE 40 MG/G
CREAM TOPICAL
Status: DISCONTINUED | OUTPATIENT
Start: 2023-08-10 | End: 2023-08-10 | Stop reason: HOSPADM

## 2023-08-10 RX ORDER — OXYCODONE HYDROCHLORIDE 10 MG/1
10 TABLET ORAL
Status: DISCONTINUED | OUTPATIENT
Start: 2023-08-10 | End: 2023-08-10 | Stop reason: HOSPADM

## 2023-08-10 RX ORDER — NALOXONE HYDROCHLORIDE 0.4 MG/ML
0.2 INJECTION, SOLUTION INTRAMUSCULAR; INTRAVENOUS; SUBCUTANEOUS
Status: DISCONTINUED | OUTPATIENT
Start: 2023-08-10 | End: 2023-08-10 | Stop reason: HOSPADM

## 2023-08-10 RX ORDER — OXYCODONE HYDROCHLORIDE 5 MG/1
5 TABLET ORAL
Status: DISCONTINUED | OUTPATIENT
Start: 2023-08-10 | End: 2023-08-10 | Stop reason: HOSPADM

## 2023-08-10 RX ORDER — ONDANSETRON 4 MG/1
TABLET, ORALLY DISINTEGRATING ORAL
COMMUNITY
Start: 2023-07-31

## 2023-08-10 RX ORDER — PROPOFOL 10 MG/ML
INJECTION, EMULSION INTRAVENOUS PRN
Status: DISCONTINUED | OUTPATIENT
Start: 2023-08-10 | End: 2023-08-10

## 2023-08-10 RX ORDER — LIDOCAINE HYDROCHLORIDE 20 MG/ML
INJECTION, SOLUTION INFILTRATION; PERINEURAL PRN
Status: DISCONTINUED | OUTPATIENT
Start: 2023-08-10 | End: 2023-08-10

## 2023-08-10 RX ORDER — HEPARIN SODIUM,PORCINE 10 UNIT/ML
5-10 VIAL (ML) INTRAVENOUS
Status: DISCONTINUED | OUTPATIENT
Start: 2023-08-10 | End: 2023-08-10 | Stop reason: HOSPADM

## 2023-08-10 RX ORDER — ONDANSETRON 4 MG/1
4 TABLET, ORALLY DISINTEGRATING ORAL EVERY 30 MIN PRN
Status: DISCONTINUED | OUTPATIENT
Start: 2023-08-10 | End: 2023-08-10 | Stop reason: HOSPADM

## 2023-08-10 RX ORDER — HEPARIN SODIUM (PORCINE) LOCK FLUSH IV SOLN 100 UNIT/ML 100 UNIT/ML
5-10 SOLUTION INTRAVENOUS
Status: DISCONTINUED | OUTPATIENT
Start: 2023-08-10 | End: 2023-08-10 | Stop reason: HOSPADM

## 2023-08-10 RX ORDER — FLUMAZENIL 0.1 MG/ML
0.2 INJECTION, SOLUTION INTRAVENOUS
Status: DISCONTINUED | OUTPATIENT
Start: 2023-08-10 | End: 2023-08-10 | Stop reason: HOSPADM

## 2023-08-10 RX ORDER — EPHEDRINE SULFATE 50 MG/ML
INJECTION, SOLUTION INTRAMUSCULAR; INTRAVENOUS; SUBCUTANEOUS PRN
Status: DISCONTINUED | OUTPATIENT
Start: 2023-08-10 | End: 2023-08-10

## 2023-08-10 RX ORDER — IOPAMIDOL 510 MG/ML
INJECTION, SOLUTION INTRAVASCULAR PRN
Status: DISCONTINUED | OUTPATIENT
Start: 2023-08-10 | End: 2023-08-10 | Stop reason: HOSPADM

## 2023-08-10 RX ORDER — HYDROMORPHONE HCL IN WATER/PF 6 MG/30 ML
0.4 PATIENT CONTROLLED ANALGESIA SYRINGE INTRAVENOUS EVERY 5 MIN PRN
Status: DISCONTINUED | OUTPATIENT
Start: 2023-08-10 | End: 2023-08-10 | Stop reason: HOSPADM

## 2023-08-10 RX ORDER — INDOMETHACIN 50 MG/1
SUPPOSITORY RECTAL PRN
Status: DISCONTINUED | OUTPATIENT
Start: 2023-08-10 | End: 2023-08-10 | Stop reason: HOSPADM

## 2023-08-10 RX ORDER — ONDANSETRON 2 MG/ML
4 INJECTION INTRAMUSCULAR; INTRAVENOUS EVERY 6 HOURS PRN
Status: DISCONTINUED | OUTPATIENT
Start: 2023-08-10 | End: 2023-08-10 | Stop reason: HOSPADM

## 2023-08-10 RX ORDER — DEXAMETHASONE SODIUM PHOSPHATE 4 MG/ML
INJECTION, SOLUTION INTRA-ARTICULAR; INTRALESIONAL; INTRAMUSCULAR; INTRAVENOUS; SOFT TISSUE PRN
Status: DISCONTINUED | OUTPATIENT
Start: 2023-08-10 | End: 2023-08-10

## 2023-08-10 RX ORDER — FENTANYL CITRATE 50 UG/ML
50 INJECTION, SOLUTION INTRAMUSCULAR; INTRAVENOUS EVERY 5 MIN PRN
Status: DISCONTINUED | OUTPATIENT
Start: 2023-08-10 | End: 2023-08-10 | Stop reason: HOSPADM

## 2023-08-10 RX ORDER — FENTANYL CITRATE 50 UG/ML
INJECTION, SOLUTION INTRAMUSCULAR; INTRAVENOUS PRN
Status: DISCONTINUED | OUTPATIENT
Start: 2023-08-10 | End: 2023-08-10

## 2023-08-10 RX ORDER — SODIUM CHLORIDE, SODIUM LACTATE, POTASSIUM CHLORIDE, CALCIUM CHLORIDE 600; 310; 30; 20 MG/100ML; MG/100ML; MG/100ML; MG/100ML
INJECTION, SOLUTION INTRAVENOUS CONTINUOUS
Status: DISCONTINUED | OUTPATIENT
Start: 2023-08-10 | End: 2023-08-10 | Stop reason: HOSPADM

## 2023-08-10 RX ORDER — NALOXONE HYDROCHLORIDE 0.4 MG/ML
0.4 INJECTION, SOLUTION INTRAMUSCULAR; INTRAVENOUS; SUBCUTANEOUS
Status: DISCONTINUED | OUTPATIENT
Start: 2023-08-10 | End: 2023-08-10 | Stop reason: HOSPADM

## 2023-08-10 RX ORDER — HYDROMORPHONE HCL IN WATER/PF 6 MG/30 ML
0.2 PATIENT CONTROLLED ANALGESIA SYRINGE INTRAVENOUS EVERY 5 MIN PRN
Status: DISCONTINUED | OUTPATIENT
Start: 2023-08-10 | End: 2023-08-10 | Stop reason: HOSPADM

## 2023-08-10 RX ORDER — CIPROFLOXACIN 750 MG/1
TABLET, FILM COATED ORAL 2 TIMES DAILY
COMMUNITY

## 2023-08-10 RX ORDER — ONDANSETRON 2 MG/ML
INJECTION INTRAMUSCULAR; INTRAVENOUS PRN
Status: DISCONTINUED | OUTPATIENT
Start: 2023-08-10 | End: 2023-08-10

## 2023-08-10 RX ADMIN — ONDANSETRON 4 MG: 2 INJECTION INTRAMUSCULAR; INTRAVENOUS at 11:24

## 2023-08-10 RX ADMIN — LIDOCAINE HYDROCHLORIDE 60 MG: 20 INJECTION, SOLUTION INFILTRATION; PERINEURAL at 10:34

## 2023-08-10 RX ADMIN — ONDANSETRON 4 MG: 2 INJECTION INTRAMUSCULAR; INTRAVENOUS at 11:58

## 2023-08-10 RX ADMIN — Medication 50 MG: at 10:37

## 2023-08-10 RX ADMIN — CIPROFLOXACIN 400 MG: 2 INJECTION INTRAVENOUS at 11:04

## 2023-08-10 RX ADMIN — SODIUM CHLORIDE, POTASSIUM CHLORIDE, SODIUM LACTATE AND CALCIUM CHLORIDE: 600; 310; 30; 20 INJECTION, SOLUTION INTRAVENOUS at 10:25

## 2023-08-10 RX ADMIN — EPHEDRINE SULFATE 10 MG: 5 INJECTION INTRAVENOUS at 11:18

## 2023-08-10 RX ADMIN — PHENYLEPHRINE HYDROCHLORIDE 50 MCG: 10 INJECTION INTRAVENOUS at 10:53

## 2023-08-10 RX ADMIN — PHENYLEPHRINE HYDROCHLORIDE 50 MCG: 10 INJECTION INTRAVENOUS at 11:02

## 2023-08-10 RX ADMIN — DEXAMETHASONE SODIUM PHOSPHATE 4 MG: 4 INJECTION, SOLUTION INTRA-ARTICULAR; INTRALESIONAL; INTRAMUSCULAR; INTRAVENOUS; SOFT TISSUE at 10:40

## 2023-08-10 RX ADMIN — FENTANYL CITRATE 50 MCG: 50 INJECTION, SOLUTION INTRAMUSCULAR; INTRAVENOUS at 10:34

## 2023-08-10 RX ADMIN — PROPOFOL 100 MG: 10 INJECTION, EMULSION INTRAVENOUS at 10:34

## 2023-08-10 RX ADMIN — PHENYLEPHRINE HYDROCHLORIDE 100 MCG: 10 INJECTION INTRAVENOUS at 11:11

## 2023-08-10 RX ADMIN — SUGAMMADEX 200 MG: 100 INJECTION, SOLUTION INTRAVENOUS at 11:31

## 2023-08-10 RX ADMIN — HEPARIN SODIUM (PORCINE) LOCK FLUSH IV SOLN 100 UNIT/ML 5 ML: 100 SOLUTION at 13:33

## 2023-08-10 ASSESSMENT — ACTIVITIES OF DAILY LIVING (ADL)
ADLS_ACUITY_SCORE: 39
ADLS_ACUITY_SCORE: 41
ADLS_ACUITY_SCORE: 35

## 2023-08-10 NOTE — DISCHARGE INSTRUCTIONS
[facility], [place]  Same-Day ERCP Procedure  Adult Discharge Orders & Instructions     For 24 hours after surgery  Get plenty of rest.  A responsible adult must stay with you for at least 24 hours after you leave the hospital.   Do not drive or use heavy equipment.  If you have weakness or tingling, don't drive or use heavy equipment until this feeling goes away.  Do not drink alcohol.  Avoid strenuous or risky activities (gym, yoga, cycling, etc.).  Ask for help when climbing stairs.   You may feel lightheaded.  IF so, sit for a few minutes before standing.  Have someone help you get up.   If you have nausea (feel sick to your stomach): Drink only clear liquids such as apple juice, ginger ale, broth or 7-Up.  Rest may also help.  Be sure to drink enough fluids.  Move to a regular diet as you feel able.  If you feel bloated or have too much gas, use a heating pad on your belly to help reduce the discomfort. This should help you feel better.   You may have a slight fever. This is normal for the first 24 hours.   You may have a dry mouth, a sore throat, muscle aches or trouble sleeping.  These are normal and will go away after 24 hours. A sore throat is most common. Use lozenges or gargle with salt water to ease the discomfort.   Do not make important or legal decisions.      Call your doctor for any of the following:  Chest pain, and/or shortness of breath  Abdominal  pain, bloating or cramping that has not improved or does not respond to pain reliving medications (Tylenol or narcotics if prescribed)   Difficulty swallowing or feeling as though food or liquids are stuck in your throat   Sore throat lasting more than 2 days or pain that has worsened over time   Black or tarry stools   Nausea and/or vomiting that is not resolving or has not responded to anti-nausea medications prescribed to you   It has been over 8 to 10 hours since surgery and you are still not able to urinate (pass  water)   Headache for over 24 hours   Fever over 100.5 F (38 C) lasting more than 24 hours after the procedure   Signs of jaundice or blockage (fever, chills, abdominal pain, yellowing of the whites of your eyes, yellowing of your skin, and/or passing darker than normal urine)     To contact a doctor, call:   Dr Del Angel's clinic at [phone] (Monday thru Friday 8:00am to 4:30pm)   [ ] [phone] and ask for the Gastroenterology/GI resident on call (answered 24 hours a day)   [ ] Emergency Department: [facility]: [phone]   Take it easy when you get home.  Remember, same day surgery DOES NOT MEAN SAME DAY RECOVERY!  Healing is a gradual process.  You will need some time to recover - you may be more tired than you realize at first.  Rest and relax for at least the first 24 hours at home.  You'll feel better and heal faster if you take good care of yourself.

## 2023-08-10 NOTE — ANESTHESIA PROCEDURE NOTES
Airway       Patient location during procedure: OR       Procedure Start/Stop Times: 8/10/2023 10:40 AM  Staff -        Anesthesiologist:  Kimber Naik MD       Resident/Fellow: Christos Pineda MD       Performed By: resident and with residents       Procedure performed by resident/fellow/CRNA in presence of a teaching physician.    Consent for Airway        Urgency: elective  Indications and Patient Condition       Indications for airway management: scott-procedural       Induction type:intravenous       Mask difficulty assessment: 1 - vent by mask    Final Airway Details       Final airway type: endotracheal airway       Successful airway: ETT - single and Oral  Endotracheal Airway Details        ETT size (mm): 6.5       Cuffed: yes       Successful intubation technique: direct laryngoscopy       DL Blade Type: MAC 3       Grade View of Cords: 1       Adjucts: stylet       Position: Right       Measured from: lips       Secured at (cm): 23       Bite block used: None    Post intubation assessment        Placement verified by: capnometry, equal breath sounds and chest rise        Number of attempts at approach: 1       Number of other approaches attempted: 0       Secured with: pink tape       Ease of procedure: easy       Dentition: Intact and Unchanged    Medication(s) Administered   Medication Administration Time: 8/10/2023 10:40 AM

## 2023-08-10 NOTE — ANESTHESIA POSTPROCEDURE EVALUATION
Patient: Prabha Bowling    Procedure: Procedure(s):  ENDOSCOPIC RETROGRADE CHOLANGIOPANCREATOGRAPHY, with pancreatic duct stent placement and balloon dilitation       Anesthesia Type:  General    Note:  Disposition: Outpatient   Postop Pain Control: Uneventful            Sign Out: Well controlled pain   PONV: No   Neuro/Psych: Uneventful            Sign Out: Acceptable/Baseline neuro status   Airway/Respiratory: Uneventful            Sign Out: Acceptable/Baseline resp. status   CV/Hemodynamics: Uneventful            Sign Out: Acceptable CV status; No obvious hypovolemia; No obvious fluid overload   Other NRE: NONE   DID A NON-ROUTINE EVENT OCCUR? No           Last vitals:  Vitals Value Taken Time   /74 08/10/23 1200   Temp 36.3  C (97.4  F) 08/10/23 1145   Pulse 65 08/10/23 1210   Resp 31 08/10/23 1210   SpO2 95 % 08/10/23 1210   Vitals shown include unvalidated device data.    Electronically Signed By: Kimber Naik MD  August 10, 2023  12:11 PM

## 2023-08-10 NOTE — ANESTHESIA CARE TRANSFER NOTE
Patient: Prabha Bowling    Procedure: Procedure(s):  ENDOSCOPIC RETROGRADE CHOLANGIOPANCREATOGRAPHY, with pancreatic duct stent placement and balloon dilitation       Diagnosis: Elevated LFTs [R79.89]  Diagnosis Additional Information: No value filed.    Anesthesia Type:   General     Note:      Level of Consciousness: awake  Oxygen Supplementation: nasal cannula  Level of Supplemental Oxygen (L/min / FiO2): 3  Independent Airway: airway patency satisfactory and stable  Dentition: dentition unchanged  Vital Signs Stable: post-procedure vital signs reviewed and stable  Report to RN Given: handoff report given  Patient transferred to: PACU    Handoff Report: Identifed the Patient, Identified the Reponsible Provider, Reviewed the pertinent medical history, Discussed the surgical course, Reviewed Intra-OP anesthesia mangement and issues during anesthesia, Set expectations for post-procedure period and Allowed opportunity for questions and acknowledgement of understanding      Vitals:  Vitals Value Taken Time   /69 08/10/23 1145   Temp 98.4    Pulse 67 08/10/23 1146   Resp 22 08/10/23 1146   SpO2 98 % 08/10/23 1146   Vitals shown include unvalidated device data.    Electronically Signed By: ANGELIA Hallman CRNA  August 10, 2023  11:47 AM

## 2023-08-10 NOTE — ANESTHESIA PREPROCEDURE EVALUATION
Anesthesia Pre-Procedure Evaluation    Patient: Prabha Bowling   MRN: [id number] : 1945        Procedure : Procedure(s):  ENDOSCOPIC RETROGRADE CHOLANGIOPANCREATOGRAPHY          Past Medical History:   Diagnosis Date    Allergic rhinitis     Constipation     Gastro-oesophageal reflux disease     Hypertension     Multiple sclerosis (H)     Osteoporosis     Sphincter of Oddi dysfunction     Urinary incontinence       Past Surgical History:   Procedure Laterality Date    bilateral breast biopsies[      bilateral cataract extractions[      CHOLECYSTECTOMY      COLONOSCOPY      ENDOSCOPIC RETROGRADE CHOLANGIOPANCREATOGRAM  2012    Procedure: ENDOSCOPIC RETROGRADE CHOLANGIOPANCREATOGRAM;  ERCP with biliary sphincterotomy and biliary stent placement;  Surgeon: Rubén Del Angel MD;  [facility]    ENDOSCOPIC RETROGRADE CHOLANGIOPANCREATOGRAM  2013    Procedure: ENDOSCOPIC RETROGRADE CHOLANGIOPANCREATOGRAM;  Endoscopic Retrograde Cholangiopancreatogram , with Biliary Sweeping;  Surgeon: Rubén Del Angel MD;  Location[facility]    ENDOSCOPIC RETROGRADE CHOLANGIOPANCREATOGRAM N/A 2022    Procedure: ENDOSCOPIC RETROGRADE CHOLANGIOPANCREATOGRAPHY with pancreatic duct balloon dilation, balloon sweep of pancreatic duct for sludge and pancreatic duct stent placed;  Surgeon: Rubén Del Angel MD;  Location[facility]    ENDOSCOPIC ULTRASOUND UPPER GASTROINTESTINAL TRACT (GI) N/A 2022    Procedure: ENDOSCOPIC ULTRASOUND, ESOPHAGOSCOPY / UPPER GASTROINTESTINAL TRACT (GI);  Surgeon: Geoffrey Quintanilla MD;  Location: [facility]     [facility] ENDOSCOPY W EUS  2013    Procedure: COMBINED ENDOSCOPIC ULTRASOUND, ESOPHAGOSCOPY, GASTROSCOPY, DUODENOSCOPY (EGD);  Surgeon: Ita Gong MD;  Location: [facility]    HYSTERECTOMY      with removal one ovary    TONSILLECTOMY        Allergies   Allergen Reactions    Alendronate GI Disturbance and Nausea and Vomiting    Codeine Confusion and Nausea and  Vomiting     Unable to function    Codeine Sulfate     Omeprazole Nausea      Social History     Tobacco Use    Smoking status: Never    Smokeless tobacco: Never   Substance Use Topics    Alcohol use: No      Wt Readings from Last 1 Encounters:   08/10/23 72.3 kg (159 lb 6.3 oz)        Anesthesia Evaluation   Pt has had prior anesthetic. Type: General.    No history of anesthetic complications       ROS/MED HX  ENT/Pulmonary:  - neg pulmonary ROS     Neurologic: Comment:   On Baclofen 20mg BID    (+)                   Multiple Sclerosis, limitations: weakness LE, uses wheelchair or walker. Difficulty lifting herself from supine/sitting position. No pain. .            Cardiovascular:     (+) Dyslipidemia hypertension-range: DOS: 196/86 / -   -  - -                                      METS/Exercise Tolerance:     Hematologic:  - neg hematologic  ROS     Musculoskeletal:  - neg musculoskeletal ROS     GI/Hepatic: Comment: Antral gastritis    (+) GERD,     hiatal hernia,              Renal/Genitourinary:  - neg Renal ROS     Endo:  - neg endo ROS     Psychiatric/Substance Use:  - neg psychiatric ROS     Infectious Disease:  - neg infectious disease ROS     Malignancy:       Other:  - neg other ROS          Physical Exam    Airway        Mallampati: I   TM distance: > 3 FB   Neck ROM: full   Mouth opening: > 3 cm    Respiratory Devices and Support         Dental       (+) Minor Abnormalities - some fillings, tiny chips      Cardiovascular   cardiovascular exam normal       Rhythm and rate: normal     Pulmonary   pulmonary exam normal        breath sounds clear to auscultation           OUTSIDE LABS:  CBC:   Lab Results   Component Value Date    WBC 4.9 06/07/2022    WBC 5.9 05/07/2013    HGB 13.3 06/07/2022    HGB 12.9 05/07/2013    HCT 41.0 06/07/2022    HCT 38.6 05/07/2013     06/07/2022     05/07/2013     BMP:   Lab Results   Component Value Date     06/07/2022     08/30/2012    POTASSIUM  3.7 06/07/2022    POTASSIUM 3.9 08/30/2012    CHLORIDE 110 (H) 06/07/2022    CHLORIDE 106 08/30/2012    CO2 27 06/07/2022    CO2 29 08/30/2012    BUN 21 06/07/2022    BUN 21 08/30/2012    CR 0.54 06/07/2022    CR 0.58 08/30/2012    GLC 87 06/07/2022     (H) 06/07/2022     COAGS:   Lab Results   Component Value Date    INR 0.95 06/07/2022     POC: No results found for: BGM, HCG, HCGS  HEPATIC:   Lab Results   Component Value Date    ALBUMIN 3.5 06/07/2022    PROTTOTAL 6.6 (L) 06/07/2022    ALT 21 06/07/2022    AST 16 06/07/2022    GGT 67 (H) 01/26/2006    ALKPHOS 88 06/07/2022    BILITOTAL 0.6 06/07/2022     OTHER:   Lab Results   Component Value Date    OMI 9.2 06/07/2022    LIPASE 91 06/07/2022    AMYLASE 41 06/07/2022    TSH 0.25 (L) 12/27/2005    SED 17 12/27/2005       Anesthesia Plan    ASA Status:  3    NPO Status:  NPO Appropriate    Anesthesia Type: General.     - Airway: ETT   Induction: Intravenous, Propofol.   Maintenance: Balanced.        Consents    Anesthesia Plan(s) and associated risks, benefits, and realistic alternatives discussed. Questions answered and patient/representative(s) expressed understanding.     - Discussed:     - Discussed with:  Patient      - Extended Intubation/Ventilatory Support Discussed: No.      - Patient is DNR/DNI Status: No     Use of blood products discussed: No .     Postoperative Care    Pain management: IV analgesics.   PONV prophylaxis: Ondansetron (or other 5HT-3), Dexamethasone or Solumedrol     Comments:                Kimber Naik MD

## 2023-08-10 NOTE — BRIEF OP NOTE
[facility]    Brief Operative Note    Pre-operative diagnosis: Elevated LFTs [R79.89]  Post-operative diagnosis Same as pre-operative diagnosis    Procedure: Procedure(s):  ENDOSCOPIC RETROGRADE CHOLANGIOPANCREATOGRAPHY, with pancreatic duct stent placement and balloon dilitation  Surgeon: Surgeon(s) and Role:     * Rubén Del Angel MD - Primary  Anesthesia: General   Estimated Blood Loss: None    Drains: None  Specimens: * No specimens in log *  Findings:   None.  Complications: None.  Implants:   Implant Name Type Inv. Item Serial No.  Lot No. LRB No. Used Action   STENT DEL ANGEL PANCREA FLEX [id number] W/O FLANGE SGL PIGTAIL - SNA Stent STENT DEL ANGEL PANCREA FLEX [id number] W/O FLANGE SGL PIGTAIL NA [place] [id number] N/A 1 Implanted       ERCP   Indications:          Follow-up of Sphincter of Oddi dysfunction. 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 Intermittently has sludge. Ursodiol 300mg BID.                          Generally done very well since last ERCP w balloon                          dilation 6/2022. Recent recurrence of similar pain,                          also nausea, bloating food. Plan assess patency of                          biliary sphincterotomy.   Providers:             RUBÉN DEL ANGEL MD, Jennifer Vanderheyden Referring MD:            Medicines:             Indomethacin 100 mg NH,  General Anesthesia   Complications:         No immediate complications.   _______________________________________________________________________________   Procedure:            Pre-Anesthesia Assessment:                          - Prior to the procedure, a History and Physical was                          performed, and patient medications and allergies were                          reviewed. The patient is competent. The risks and                          benefits of the procedure and the sedation options and                          risks were discussed with the patient. All questions                          were answered and informed consent was obtained.                          Patient identification and proposed procedure were                          verified by the physician. Mental Status Examination:                          normal. CV Examination: normal. Prophylactic                          Antibiotics: The patient requires prophylactic                          antibiotics. Prior Anticoagulants: The patient has                          taken no anticoagulant or antiplatelet agents. ASA                          Grade Assessment: III - A patient with severe systemic                          disease. After reviewing the risks and benefits, the                          patient was deemed in satisfactory condition to                          undergo the procedure. The anesthesia plan was to use                          general anesthesia. Immediately prior to                          administration of medications, the patient was                          re-assessed for adequacy to receive sedatives. The                          heart rate, respiratory rate, oxygen saturations,                          blood pressure, adequacy of pulmonary ventilation, and                          response to care were monitored throughout the                          procedure. The physical status of the patient was                           re-assessed after the procedure.                          After obtaining informed consent, the scope was passed                          under direct vision. Throughout the procedure, the                          patient's blood pressure, pulse, and oxygen                          saturations were monitored continuously. The                          Duodenoscope was introduced through the mouth, and                          used to inject contrast into and used to inject                          contrast into the bile duct and ventral pancreatic                          duct. The ERCP was accomplished without difficulty.                          The patient tolerated the procedure well.                                                                                    Findings:       A biliary sphincterotomy had been performed. The sphincterotomy appeared        open. NovaGold passed successfully into the tail of the pancreatic duct        from the major papilla. One 4 Fr by 11 cm pancreatic stent with a 3/4        external pigtail and no internal flaps was placed into the ventral        pancreatic duct. Clear fluid flowed through the stent. The stent was in        good position. The bile duct was deeply cannulated with the short-nosed        traction sphincterotome. Contrast was injected. I personally interpreted        the bile duct images. The main bile duct was severely dilated and        diffusely dilated, acquired. The largest diameter was 12 mm. Dilation of        the common bile duct with a 10 mm balloon dilator was successful.                                                                                    Impression:            - Dilated bile duct with patent sphincterotomy with                          some residual through subtle sphincter remaining                          - Balloon dilated biliary sphincter to 10mm after                          protective soft pancreatic stent                           - Uncertain role of above w pain   Recommendation:        - Observe patient in same day observation unit for 2                          hours for possible discharge same day.                          - Confirm spontaneous stent passage by performing a                          KUB x-ray in 3 weeks.                          - Return to my office in 2 months.- if nausea bloating                          persist may work up for gastroparesis                                                                                      Electronically signed by CANDELARIA Del Angel   ________________________   NASIM DEL ANGEL MD   8/10/2023 11:44:02 AM   I was physically present for the entire viewing portion of the exam.   __________________________   Signature of teaching physician   [id number] DEL ANGEL MD   Number of Addenda: 0     Note Initiated On: 8/10/2023 9:34 AM   Scope In:   Scope Out:

## 2023-08-15 ENCOUNTER — PATIENT OUTREACH (OUTPATIENT)
Dept: GASTROENTEROLOGY | Facility: CLINIC | Age: 78
End: 2023-08-15
Payer: COMMERCIAL

## 2023-08-15 ENCOUNTER — DOCUMENTATION ONLY (OUTPATIENT)
Dept: GASTROENTEROLOGY | Facility: CLINIC | Age: 78
End: 2023-08-15
Payer: COMMERCIAL

## 2023-08-15 DIAGNOSIS — Z46.89 ENCOUNTER FOR REMOVAL OF PANCREATIC STENT: Primary | ICD-10-CM

## 2023-08-15 NOTE — PROGRESS NOTES
Faxed imaging order to Patient's PCP per request.     Imaging ordered by Dr. Rubén Del Angel:  X-ray Abdomen 2 vw  *Note: Images due on: 8/31/2023    Facility Information:  PCP: Dr. Fabiano Andersen  [address] , [place] [address]  Phone #: [phone]  Fax: [phone]   Radiology Fax #: [phone]        SK

## 2023-08-15 NOTE — TELEPHONE ENCOUNTER
Post ERCP with Dr. Del Angel 08/15/23  onfirm spontaneous stent passage by performing a     KUB x-ray in 3 weeks.     - Return to my office in 2 months.- if nausea bloating persist may work up for gastroparesis     Orders placed for xray, will do locally, PCP updated  Fabiano Andersen    Video clinic on 10/30/23 at 1:30pm    Patient feeling well after procedure, understands follow up    ML

## 2023-08-17 NOTE — PROGRESS NOTES
Called [facility] to follow-up on faxed order. Radiologist confirmed receipt and order due date. Stated that they would call the Patient to schedule.     Imaging ordered by Dr. Rubén Del Angel:  X-ray Abdomen 2 vw  *Note: Images due on: 8/31/2023     Facility Information:  PCP: Dr. Fabiano Andersen  [facility]  [address] , [place] [address]  Phone #: [phone]  Fax: [phone]   Radiology Fax #: [phone]           SK

## 2023-08-30 NOTE — PROGRESS NOTES
Called [facility] to request images be pushed to [place] PACS.     Imaging ordered by Dr. Rubén Del Angel:  X-ray Abdomen 2 vw  *Note: Images due on: 8/31/2023     Facility Information:  PCP: Dr. Fabiano Andersen  [facility]  [address] , [place] [address]  Phone #: [phone]  Fax: [phone]   Radiology Fax #: [phone]           SK

## 2023-09-06 ENCOUNTER — DOCUMENTATION ONLY (OUTPATIENT)
Dept: GASTROENTEROLOGY | Facility: CLINIC | Age: 78
End: 2023-09-06
Payer: COMMERCIAL

## 2023-09-06 NOTE — PROGRESS NOTES
Called Patient to relay that Dr. Rubén Del Angel had reviewed recent xrays and noted that the stents are now gone, as we had hoped.     Patient was informed that there are no plans for additional procedure at this time.     Reminded Patient to reach out with any questions, concerns or recurrent symptoms.    Left VM.      SK

## 2023-10-25 ENCOUNTER — DOCUMENTATION ONLY (OUTPATIENT)
Dept: GASTROENTEROLOGY | Facility: CLINIC | Age: 78
End: 2023-10-25
Payer: COMMERCIAL

## 2023-10-25 NOTE — PROGRESS NOTES
Called PT and confirmed Appointment.    Called to remind patient of their upcoming appointment with our GI clinic, on 10/30/23 at 1:30 PM with Dr. Rubén Del Angel. This appointment is scheduled as a video visit. You will receive a call approximately 30 minutes prior to check you in, you must be in [place] for this visit., if your appointment is virtual (video or telephone) you need to be in [place] for the visit. To reschedule or cancel patient to call [phone].      SK

## 2023-10-30 ENCOUNTER — VIRTUAL VISIT (OUTPATIENT)
Dept: GASTROENTEROLOGY | Facility: CLINIC | Age: 78
End: 2023-10-30
Payer: COMMERCIAL

## 2023-10-30 VITALS — BODY MASS INDEX: 29.26 KG/M2 | WEIGHT: 160 LBS

## 2023-10-30 DIAGNOSIS — K83.4 SPHINCTER OF ODDI DYSFUNCTION: Primary | ICD-10-CM

## 2023-10-30 PROCEDURE — 99212 OFFICE O/P EST SF 10 MIN: CPT | Mod: VID | Performed by: INTERNAL MEDICINE

## 2023-10-30 ASSESSMENT — PAIN SCALES - GENERAL: PAINLEVEL: NO PAIN (0)

## 2023-10-30 NOTE — PATIENT INSTRUCTIONS
Follow up:    Dr. Del Angel has outlined the following steps after your recent clinic visit:    Follow up with us as needed.        Please call with any questions or concerns regarding your clinic visit today.    It is a pleasure being involved in your health care.    Contacts post-consultation depending on your need:    Schedule Clinic Appointments            [phone] # 1   M-F 7:30 - 5 pm    Tata Acevedo RN Care Coordinator (Dr. Quintanilla/Dr. Del Angel)  [phone]    Maria Luz Perez, RN Care Coordinator (Dr. Sosa)   [phone]    Sachi Guzman, RN Care Coordinator (Dr. Amaral/Dr. Mayo)  [phone]    [facility]  (all [facility])   [phone]      For urgent/emergent questions after business hours, you may reach the on-call GI Fellow by contacting the [facility]  at [phone].    How do I schedule labs, imaging studies, or procedures that were ordered in clinic today?     Labs: To schedule lab appointment at the [facility] and [facility], use my chart or call [phone]. If you have a [place] lab closer to home where you are regularly seen you can give them a call.     Procedures: If a colonoscopy, upper endoscopy, breath test, esophageal manometry, or pH impedence was ordered today, our endoscopy team will call you to schedule this. If you have not heard from our endoscopy team within a week, please call [phone] to schedule.     Imaging Studies: If you were scheduled for a CT scan, X-ray, MRI, ultrasound, HIDA scan or other imaging study, please call [phone] to have this scheduled.     Referral: If a referral to another specialty was ordered, expect a phone call or follow instructions above. If you have not heard from anyone regarding your referral in a week, please call our clinic to check the status.     How to I schedule a follow-up visit?  If you did not schedule a follow-up visit today, please call [phone] to schedule a  follow-up office visit.

## 2023-10-30 NOTE — PROGRESS NOTES
Katia is a delightful 78-year-old with a long history of sphincter of Oddi dysfunction with biliary type papillary stenosis.  She is first treated by us in 2009 with extension of prior sphincterotomies done elsewhere.  She is at a persistently dilated bile duct with funneling patent biliary sphincterotomy but creating a final effect.  He has been treated with about every 2-year ERCP and balloon dilation until about 2 6013.  Then she did well until 2022 in June when we repeated ERCP with clearance of sludge and balloon dilation to 10 mm of the biliary sphincter.  She did well for a number of months but then relapsed to severe daily pain.  In August of this year we did protective pancreatic stent followed by 10 mm balloon dilation for extended interval.  Fortunately now she reports that the pain is completely resolved as of the last few months and she is very happy with the outcome.  We spent about 10 minutes total discussing that her outcome is excellent we did review the option of extending the biliary sphincterotomy with with a short-term metal stent but given her age and the good tolerance of these procedures probably not worth the risk of a severe complication such as perforation with a limited funneling biliary sphincter that she has remaining plan is follow-up as needed any recurrence of pain    Past Medical, Surgical, Family, and Social Histories:    Past Medical History:   Diagnosis Date    Allergic rhinitis     Constipation     Gastro-oesophageal reflux disease     Hypertension     Multiple sclerosis (H)     Osteoporosis     Sphincter of Oddi dysfunction     Urinary incontinence        Past Surgical History:   Procedure Laterality Date    bilateral breast biopsies[      bilateral cataract extractions[      CHOLECYSTECTOMY      COLONOSCOPY      ENDOSCOPIC RETROGRADE CHOLANGIOPANCREATOGRAM  8/30/2012    Procedure: ENDOSCOPIC RETROGRADE CHOLANGIOPANCREATOGRAM;  ERCP with biliary sphincterotomy and biliary stent  placement;  Surgeon: Rubén Del Angel MD;  Location: [facility]    ENDOSCOPIC RETROGRADE CHOLANGIOPANCREATOGRAM  6/14/2013    Procedure: ENDOSCOPIC RETROGRADE CHOLANGIOPANCREATOGRAM;  Endoscopic Retrograde Cholangiopancreatogram , with Biliary Sweeping;  Surgeon: Rubén Del Angel MD;  Location: [facility] OR    ENDOSCOPIC RETROGRADE CHOLANGIOPANCREATOGRAM N/A 6/7/2022    Procedure: ENDOSCOPIC RETROGRADE CHOLANGIOPANCREATOGRAPHY with pancreatic duct balloon dilation, balloon sweep of pancreatic duct for sludge and pancreatic duct stent placed;  Surgeon: Rubén Del Angel MD;  Location: [facility] OR    ENDOSCOPIC RETROGRADE CHOLANGIOPANCREATOGRAM N/A 8/10/2023    Procedure: ENDOSCOPIC RETROGRADE CHOLANGIOPANCREATOGRAPHY, with pancreatic duct stent placement and balloon dilitation;  Surgeon: Rubén Del Angel MD;  Location: [facility]    ENDOSCOPIC ULTRASOUND UPPER GASTROINTESTINAL TRACT (GI) N/A 6/7/2022    Procedure: ENDOSCOPIC ULTRASOUND, ESOPHAGOSCOPY / UPPER GASTROINTESTINAL TRACT (GI);  Surgeon: Geoffrey Quintanilla MD;  [facility]    [facility] ENDOSCOPY W EUS  5/7/2013    Procedure: COMBINED ENDOSCOPIC ULTRASOUND, ESOPHAGOSCOPY, GASTROSCOPY, DUODENOSCOPY (EGD);  Surgeon: Ita Gong MD;  Location:  GI    HYSTERECTOMY      with removal one ovary    TONSILLECTOMY         Family History   Problem Relation Age of Onset    Asthma Brother     C.A.D. Father     C.A.D. Mother     Hypertension Mother     Hypertension Father     Hypertension Sister     Hypertension Sister     Prostate Cancer Brother     Cancer Mother         brain CA       Social History     Tobacco Use    Smoking status: Never    Smokeless tobacco: Never   Substance Use Topics    Alcohol use: No

## 2023-10-30 NOTE — PROGRESS NOTES
Prabha Bowling is a 78 year old female who is being evaluated via a billable video visit.    Virtual Visit Details    Type of service:  Video Visit   Joined the call at 10/30/2023, 2:00:41 pm.  Left the call at 10/30/2023, 2:08:37 pm.  You were on the call for 7 minutes 55 seconds .    Originating Location (pt. Location): Home    Distant Location (provider location):  Off-site  Platform used for Video Visit: Sayda

## 2023-10-30 NOTE — LETTER
10/30/2023         RE: Prabha Bowling  [address]  [facility] [address]        Dear Colleague,    Thank you for referring your patient, Prabha Bowling, to the [facility] [place]. Please see a copy of my visit note below.    Prabha Bowling is a 78 year old female who is being evaluated via a billable video visit.    Virtual Visit Details    Type of service:  Video Visit   Joined the call at 10/30/2023, 2:00:41 pm.  Left the call at 10/30/2023, 2:08:37 pm.  You were on the call for 7 minutes 55 seconds .    Originating Location (pt. Location): Home    Distant Location (provider location):  Off-site  Platform used for Video Visit: Sayda Montalvo is a delightful 78-year-old with a long history of sphincter of Oddi dysfunction with biliary type papillary stenosis.  She is first treated by us in 2009 with extension of prior sphincterotomies done elsewhere.  She is at a persistently dilated bile duct with funneling patent biliary sphincterotomy but creating a final effect.  He has been treated with about every 2-year ERCP and balloon dilation until about 2 6013.  Then she did well until 2022 in June when we repeated ERCP with clearance of sludge and balloon dilation to 10 mm of the biliary sphincter.  She did well for a number of months but then relapsed to severe daily pain.  In August of this year we did protective pancreatic stent followed by 10 mm balloon dilation for extended interval.  Fortunately now she reports that the pain is completely resolved as of the last few months and she is very happy with the outcome.  We spent about 10 minutes total discussing that her outcome is excellent we did review the option of extending the biliary sphincterotomy with with a short-term metal stent but given her age and the good tolerance of these procedures probably not worth the risk of a severe complication such as perforation with a limited funneling biliary sphincter  that she has remaining plan is follow-up as needed any recurrence of pain    Past Medical, Surgical, Family, and Social Histories:    Past Medical History:   Diagnosis Date    Allergic rhinitis     Constipation     Gastro-oesophageal reflux disease     Hypertension     Multiple sclerosis (H)     Osteoporosis     Sphincter of Oddi dysfunction     Urinary incontinence        Past Surgical History:   Procedure Laterality Date    bilateral breast biopsies[      bilateral cataract extractions[      CHOLECYSTECTOMY      COLONOSCOPY      ENDOSCOPIC RETROGRADE CHOLANGIOPANCREATOGRAM  8/30/2012    Procedure: ENDOSCOPIC RETROGRADE CHOLANGIOPANCREATOGRAM;  ERCP with biliary sphincterotomy and biliary stent placement;  Surgeon: Rubén Del Angel MD;  Location[facility]    ENDOSCOPIC RETROGRADE CHOLANGIOPANCREATOGRAM  6/14/2013    Procedure: ENDOSCOPIC RETROGRADE CHOLANGIOPANCREATOGRAM;  Endoscopic Retrograde Cholangiopancreatogram , with Biliary Sweeping;  Surgeon: Rubén Del Angel MD;  Location: [facility]    ENDOSCOPIC RETROGRADE CHOLANGIOPANCREATOGRAM N/A 6/7/2022    Procedure: ENDOSCOPIC RETROGRADE CHOLANGIOPANCREATOGRAPHY with pancreatic duct balloon dilation, balloon sweep of pancreatic duct for sludge and pancreatic duct stent placed;  Surgeon: Rubén Del Angel MD;  [facility]    ENDOSCOPIC RETROGRADE CHOLANGIOPANCREATOGRAM N/A 8/10/2023    Procedure: ENDOSCOPIC RETROGRADE CHOLANGIOPANCREATOGRAPHY, with pancreatic duct stent placement and balloon dilitation;  Surgeon: Rubén Del Angel MD;  [facility]    ENDOSCOPIC ULTRASOUND UPPER GASTROINTESTINAL TRACT (GI) N/A 6/7/2022    Procedure: ENDOSCOPIC ULTRASOUND, ESOPHAGOSCOPY / UPPER GASTROINTESTINAL TRACT (GI);  Surgeon: Geoffrey Quintanilla MD;  Location:  OR     [facility] ENDOSCOPY W EUS  5/7/2013    Procedure: COMBINED ENDOSCOPIC ULTRASOUND, ESOPHAGOSCOPY, GASTROSCOPY, DUODENOSCOPY (EGD);  Surgeon: Ita Gong MD;  Location:  GI     HYSTERECTOMY      with removal one ovary    TONSILLECTOMY         Family History   Problem Relation Age of Onset    Asthma Brother     C.A.D. Father     C.A.D. Mother     Hypertension Mother     Hypertension Father     Hypertension Sister     Hypertension Sister     Prostate Cancer Brother     Cancer Mother         brain CA       Social History     Tobacco Use    Smoking status: Never    Smokeless tobacco: Never   Substance Use Topics    Alcohol use: No         Again, thank you for allowing me to participate in the care of your patient.      Sincerely,    Rubén Del Angel MD

## 2023-10-30 NOTE — NURSING NOTE
Is the patient currently in the state of [place]? YES    Visit mode:VIDEO    If the visit is dropped, the patient can be reconnected by: VIDEO VISIT: Text to cell phone:   Telephone Information:   Mobile [phone]       Will anyone else be joining the visit? NO  (If patient encounters technical issues they should call [phone]    How would you like to obtain your AVS? MyChart    Are changes needed to the allergy or medication list? No    Reason for visit: No chief complaint on file.    Li PURCELL

## 2024-03-17 ENCOUNTER — HEALTH MAINTENANCE LETTER (OUTPATIENT)
Age: 79
End: 2024-03-17

## 2025-03-22 ENCOUNTER — HEALTH MAINTENANCE LETTER (OUTPATIENT)
Age: 80
End: 2025-03-22

## (undated) DEVICE — ENDO FUSION OMNI-TOME 21 FS-OMNI-21 G48675

## (undated) DEVICE — SOL WATER IRRIG 1000ML BOTTLE 2F7114

## (undated) DEVICE — KIT ENDO FIRST STEP DISINFECTANT 200ML W/POUCH EP-4

## (undated) DEVICE — INFLATION DEVICE BIG 60 ENDO-AN6012

## (undated) DEVICE — PACK ENDOSCOPY GI CUSTOM UMMC

## (undated) DEVICE — ENDO EXTRACTOR BALLOON 09-12MM 4690

## (undated) DEVICE — GUIDEWIRE NOVAGOLD .018X260CM STR TIP M00552000

## (undated) DEVICE — PAD CHUX UNDERPAD 23X24" 7136

## (undated) DEVICE — ENDO DEVICE LOCKING AND BIOPSY CAP M00545261

## (undated) DEVICE — ENDO TUBING CO2 SMARTCAP STERILE DISP 100145CO2EXT

## (undated) DEVICE — SUCTION MANIFOLD NEPTUNE 2 SYS 4 PORT 0702-020-000

## (undated) DEVICE — ENDO PROBE COVER ULTRASOUND BALLOON LATEX  MAJ-249

## (undated) DEVICE — KIT CONNECTOR FOR OLYMPUS ENDOSCOPES DEFENDO 100310

## (undated) DEVICE — WIRE GUIDE TRACER METRO DIRECT .021"X260CM STR TIP G55705

## (undated) DEVICE — GLOVE BIOGEL PI ULTRATOUCH SZ 8.0 41180

## (undated) DEVICE — ENDO CATH BALLOON DILATION HURRICAINE 10MMX4CM M00545960

## (undated) DEVICE — ESU GROUND PAD ADULT W/CORD E7507

## (undated) DEVICE — BALLOON EXTRACTION 15X1950MM 3.2MM TL B-V243Q-A

## (undated) DEVICE — BIOPSY VALVE BIOSHIELD 00711135

## (undated) DEVICE — ENDO BITE BLOCK ADULT OMNI-BLOC

## (undated) DEVICE — ENDO CATH BALLOON DILATION HURRICANE 10MMX4X180CM M00545960

## (undated) RX ORDER — ONDANSETRON 2 MG/ML
INJECTION INTRAMUSCULAR; INTRAVENOUS
Status: DISPENSED
Start: 2023-08-10

## (undated) RX ORDER — HYDRALAZINE HYDROCHLORIDE 20 MG/ML
INJECTION INTRAMUSCULAR; INTRAVENOUS
Status: DISPENSED
Start: 2022-06-07

## (undated) RX ORDER — ONDANSETRON 2 MG/ML
INJECTION INTRAMUSCULAR; INTRAVENOUS
Status: DISPENSED
Start: 2022-06-07

## (undated) RX ORDER — FENTANYL CITRATE 50 UG/ML
INJECTION, SOLUTION INTRAMUSCULAR; INTRAVENOUS
Status: DISPENSED
Start: 2022-06-07

## (undated) RX ORDER — DEXAMETHASONE SODIUM PHOSPHATE 4 MG/ML
INJECTION, SOLUTION INTRA-ARTICULAR; INTRALESIONAL; INTRAMUSCULAR; INTRAVENOUS; SOFT TISSUE
Status: DISPENSED
Start: 2022-06-07

## (undated) RX ORDER — FENTANYL CITRATE 50 UG/ML
INJECTION, SOLUTION INTRAMUSCULAR; INTRAVENOUS
Status: DISPENSED
Start: 2023-08-10

## (undated) RX ORDER — SODIUM CHLORIDE, SODIUM LACTATE, POTASSIUM CHLORIDE, CALCIUM CHLORIDE 600; 310; 30; 20 MG/100ML; MG/100ML; MG/100ML; MG/100ML
INJECTION, SOLUTION INTRAVENOUS
Status: DISPENSED
Start: 2022-06-07

## (undated) RX ORDER — EPHEDRINE SULFATE 50 MG/ML
INJECTION, SOLUTION INTRAMUSCULAR; INTRAVENOUS; SUBCUTANEOUS
Status: DISPENSED
Start: 2023-08-10